# Patient Record
Sex: FEMALE | Race: ASIAN | NOT HISPANIC OR LATINO
[De-identification: names, ages, dates, MRNs, and addresses within clinical notes are randomized per-mention and may not be internally consistent; named-entity substitution may affect disease eponyms.]

---

## 2018-10-25 ENCOUNTER — TRANSCRIPTION ENCOUNTER (OUTPATIENT)
Age: 68
End: 2018-10-25

## 2018-10-27 ENCOUNTER — EMERGENCY (EMERGENCY)
Facility: HOSPITAL | Age: 68
LOS: 1 days | Discharge: HOME | End: 2018-10-27
Attending: EMERGENCY MEDICINE | Admitting: EMERGENCY MEDICINE
Payer: MEDICARE

## 2018-10-27 ENCOUNTER — TRANSCRIPTION ENCOUNTER (OUTPATIENT)
Age: 68
End: 2018-10-27

## 2018-10-27 VITALS
RESPIRATION RATE: 16 BRPM | HEART RATE: 92 BPM | DIASTOLIC BLOOD PRESSURE: 66 MMHG | OXYGEN SATURATION: 98 % | SYSTOLIC BLOOD PRESSURE: 110 MMHG | TEMPERATURE: 98 F

## 2018-10-27 PROCEDURE — 73110 X-RAY EXAM OF WRIST: CPT | Mod: 26,RT

## 2018-10-27 PROCEDURE — 29125 APPL SHORT ARM SPLINT STATIC: CPT | Mod: RT

## 2018-10-27 PROCEDURE — 99284 EMERGENCY DEPT VISIT MOD MDM: CPT | Mod: 25

## 2018-10-27 PROCEDURE — 73630 X-RAY EXAM OF FOOT: CPT | Mod: 26,RT

## 2018-10-27 PROCEDURE — 73090 X-RAY EXAM OF FOREARM: CPT | Mod: 26,RT

## 2018-10-27 PROCEDURE — 73610 X-RAY EXAM OF ANKLE: CPT | Mod: 26,RT

## 2018-10-27 PROCEDURE — 71046 X-RAY EXAM CHEST 2 VIEWS: CPT | Mod: 26

## 2018-10-27 PROCEDURE — 73130 X-RAY EXAM OF HAND: CPT | Mod: 26,RT

## 2018-10-27 PROCEDURE — 29515 APPLICATION SHORT LEG SPLINT: CPT | Mod: RT

## 2018-10-27 PROCEDURE — 73502 X-RAY EXAM HIP UNI 2-3 VIEWS: CPT | Mod: 26,RT

## 2018-10-27 RX ORDER — IBUPROFEN 200 MG
600 TABLET ORAL ONCE
Qty: 0 | Refills: 0 | Status: COMPLETED | OUTPATIENT
Start: 2018-10-27 | End: 2018-10-27

## 2018-10-27 RX ORDER — KETOROLAC TROMETHAMINE 30 MG/ML
30 SYRINGE (ML) INJECTION ONCE
Qty: 0 | Refills: 0 | Status: DISCONTINUED | OUTPATIENT
Start: 2018-10-27 | End: 2018-10-27

## 2018-10-27 RX ORDER — ACETAMINOPHEN 500 MG
650 TABLET ORAL ONCE
Qty: 0 | Refills: 0 | Status: COMPLETED | OUTPATIENT
Start: 2018-10-27 | End: 2018-10-27

## 2018-10-27 RX ORDER — AMOXICILLIN 250 MG/5ML
500 SUSPENSION, RECONSTITUTED, ORAL (ML) ORAL ONCE
Qty: 0 | Refills: 0 | Status: COMPLETED | OUTPATIENT
Start: 2018-10-27 | End: 2018-10-27

## 2018-10-27 RX ADMIN — Medication 650 MILLIGRAM(S): at 13:12

## 2018-10-27 RX ADMIN — Medication 500 MILLIGRAM(S): at 13:12

## 2018-10-27 RX ADMIN — Medication 600 MILLIGRAM(S): at 13:12

## 2018-10-27 NOTE — ED ADULT TRIAGE NOTE - CHIEF COMPLAINT QUOTE
Pt c/o right wrist pain s/p mechanical trip and fall last night, denies blood thinner use/loc/trauma to head/cp/sob/ligtheadedness/dizziness. Pt also states she has been suffering from flu like symptoms which have been slowly resolving. Recent travel to [place] returned 4 days ago.

## 2018-10-27 NOTE — ED PROVIDER NOTE - CARE PLAN
Principal Discharge DX:	Fracture of hand  Secondary Diagnosis:	Ankle sprain Principal Discharge DX:	Fracture of metacarpal shaft of right hand, closed, initial encounter  Assessment and plan of treatment:	You had a thorough evaluation including an exam, labs and imaging. You were given medications for comfort. Please read the attached information sheets as they will provide useful information regarding your condition.    Your discharge diagnosis is: 5th hand metacarpal fracture, tonsilitis, ankle sprain  Please take amoxicillin 500mg x2, 10days  Return precautions to the Emergency Department include but are not limited to: unrelenting nausea, vomiting, fever, difficulty swallowing/breathing, shortness of breath, numbness or tingling, loss of sensation, loss of motor function, or any other concerning symptoms.    1) Follow up with your medical doctor in 2-3 days  2) You should also establish care with Orthopedics/ Hand Surgery/ ENT by calling phone on the ref list [facility] has given to you,  to find a doctor affiliated with [facility] in your neighborhood & network. Please bring your labs and imaging with you to your appointment, as needed.  3) Please take Ibuprofen (aka Motrin, Advil) and/or Acetaminophen (aka Tylenol) over the counter as directed by packaging, as needed, for mild-moderate pain, which can be taken 3-4 hours apart from each other to have a layered effect.  Please do not take these medications if you do no have pain or if you have any history of bleeding disorders, kidney or liver disease. Do not use ibuprofen if you are on blood thinners (anti-coagulation).  4) Drink at least 2 Liters or 64 Ounces of water each day.    - Rest, apply ice over covered skin for no more than 15 minutes at a time, keep affected extremity elevated, use compressive dressing or splint as provided and instructed.  - Please return to the ED should you have any new or worsening symptoms, worsening pain, develop numbness or discoloration or pain in the fingers or any concerning symptoms  - Please follow up with Orthopedic/Hand surgeon  Secondary Diagnosis:	Ankle sprain  Secondary Diagnosis:	Tonsillitis

## 2018-10-27 NOTE — ED PROCEDURE NOTE - NS ED PERI VASCULAR NEG
fingers/toes warm to touch/no swelling/no cyanosis of extremity/capillary refill time < 2 seconds/no paresthesia
fingers/toes warm to touch/no swelling/no cyanosis of extremity/no paresthesia/capillary refill time < 2 seconds

## 2018-10-27 NOTE — ED ADULT TRIAGE NOTE - NS ED NOTE AC [place] CHART2
Clinicians should consider the possibility of Raffaele Influenza A (H7N9) virus infection in people presenting with respiratory illness within 10 days of travel to [place], particularly if the patient reports exposure to birds or poultry markets. Presentation of the flu-like illness can range from severe to mild respiratory illness. When Raffaele Influenza is suspected place a mask on the patient and place within a negative pressure room. Health care personnel wear an N95 respirator, gown, and gloved - adhere to Airborne, Contact, and Standard Precautions.

## 2018-10-27 NOTE — ED PROVIDER NOTE - PHYSICAL EXAMINATION
PGY1/MD Mason.   GEN: No apparent distress, A & O x 4  HEAD/EYES: NC/AT, anicteric sclerae, no conjunctival pallor  ENT: mucus membranes moist, oropharynx + right tonsil enlargement+erythema /wo ubula displacement or discharge, trachea midline, no JVD, neck is supple, no airway compromise  RESP: lungs CTA with equal breath sounds bilaterally, chest wall nontender and atraumatic  CV: heart with reg rhythm S1, S2, no murmur  ABDOMEN: normoactive bowel sounds, soft, nondistended, nontender  Right Hand  Sensation intact to light touch in first dorsalis web space, 5th/3rd finger volar tip.  Motor: flexion & extension against resistance in 1st to 5th fingers, wrist/finger extension off table. + tender on 5th metacarpal.  Skin/Inspection: No erythema, no abrasion, no bruises, +swelling lateral dorsal hand. No snuff box tenderness.  Vascular: CRT<2sec in all digits.  Right Ankle  not able to bear weight in the ED. +lateral malleolar swelling and tender, no navicular/ but + base of 5th metatarsal tenderness. When held in dorsiflexion, pt is not able to ramy its foot. Negative anterior drawer test for lateral ligamentous ankle, negative squeeze test for distal tibiofibular syndesmotic ligament, netative  Sensation: sensation intact to light touch in 5th/1st finger volar tip, inner/outer legs.  Motor: decreased muscle strength due to pain  ROM: limited rom 2/2 pain  Skin: No erythema, no abrasion.  Vascular: CRT<2sec in all digits.

## 2018-10-27 NOTE — ED PROVIDER NOTE - MEDICAL DECISION MAKING DETAILS
PGY1/MD Mason. 69 yo F with no PMH, s/p fall last night, 2/2 weakness, p/w right hand+foot pain. +right 5th metacarpaon oblique fracture, pt has right tonsilitis, needs empirical treatment as streptococcal, plan for cxr, consult for hand, treat tonsilitis, likely d/c and f/u with Ortho+ENT

## 2018-10-27 NOTE — ED PROCEDURE NOTE - NS ED PERI NEURO NEG
Pre-application: Motor, sensory, and vascular responses intact in the injured extremity./Post-application: Motor, sensory, and vascular responses intact in the injured extremity./The patient/caregiver verbalized understanding of how to care for the injured extremity with splint
Pre-application: Motor, sensory, and vascular responses intact in the injured extremity./The patient/caregiver verbalized understanding of how to care for the injured extremity with splint/Post-application: Motor, sensory, and vascular responses intact in the injured extremity.

## 2018-10-27 NOTE — ED PROVIDER NOTE - ATTENDING CONTRIBUTION TO CARE
Dr. Barbosa:  I have personally performed a face to face bedside history and physical examination of this patient. I have discussed the history, examination, review of systems, assessment and plan of management with the resident. I have reviewed the electronic medical record and amended it to reflect my history, review of systems, physical exam, assessment and plan.    68F s/p mechanical fall yesterday, landed on R arm/hand and inverted R foot.  Went to urgent care, diagnosed with R 5th metacarpal fracture.  Also endorses URI sx, although improving.      Exam:  - nad  - rrr  - ctab  - abd soft ntnd  -    A/P  - Dr. Barbosa:  I have personally performed a face to face bedside history and physical examination of this patient. I have discussed the history, examination, review of systems, assessment and plan of management with the resident. I have reviewed the electronic medical record and amended it to reflect my history, review of systems, physical exam, assessment and plan.    68F s/p mechanical fall yesterday, landed on R arm/hand and inverted R foot.  Went to urgent care, diagnosed with R 5th metacarpal fracture.  Also endorses URI sx, although improving, but still T 101 this morning.    Exam:  - nad  - rrr  - ctab  - abd soft ntnd  - +pain with ranging right hip, +swelling and bruising over right ulnar side of hand, neurovascularly intact, no snuffbox tenderness; +swelling and bruising to lateral and medial malleoli of right ankle, +bruising over proximal dorsum of foot, neurovascularly intact in foot    A/P  - mechanical fall; URI eval PNA  - XR right wrist/forearm/hand, XR pelvis/R hip/ankle/foot

## 2018-10-27 NOTE — ED PROVIDER NOTE - PLAN OF CARE
You had a thorough evaluation including an exam, labs and imaging. You were given medications for comfort. Please read the attached information sheets as they will provide useful information regarding your condition.    Your discharge diagnosis is: 5th hand metacarpal fracture, tonsilitis, ankle sprain  Please take amoxicillin 500mg x2, 10days  Return precautions to the Emergency Department include but are not limited to: unrelenting nausea, vomiting, fever, difficulty swallowing/breathing, shortness of breath, numbness or tingling, loss of sensation, loss of motor function, or any other concerning symptoms.    1) Follow up with your medical doctor in 2-3 days  2) You should also establish care with Orthopedics/ Hand Surgery/ ENT by calling phone on the ref list [facility] has given to you,  to find a doctor affiliated with [facility] in your neighborhood & network. Please bring your labs and imaging with you to your appointment, as needed.  3) Please take Ibuprofen (aka Motrin, Advil) and/or Acetaminophen (aka Tylenol) over the counter as directed by packaging, as needed, for mild-moderate pain, which can be taken 3-4 hours apart from each other to have a layered effect.  Please do not take these medications if you do no have pain or if you have any history of bleeding disorders, kidney or liver disease. Do not use ibuprofen if you are on blood thinners (anti-coagulation).  4) Drink at least 2 Liters or 64 Ounces of water each day.    - Rest, apply ice over covered skin for no more than 15 minutes at a time, keep affected extremity elevated, use compressive dressing or splint as provided and instructed.  - Please return to the ED should you have any new or worsening symptoms, worsening pain, develop numbness or discoloration or pain in the fingers or any concerning symptoms  - Please follow up with Orthopedic/Hand surgeon

## 2018-10-27 NOTE — ED PROVIDER NOTE - PROGRESS NOTE DETAILS
Familia: spoke with Dr. Jennifer Kovacs (on hand call today).  Agreeable to ED ulnar-gutter splint for the oblique metacarpal fracture and will see her in office next week. PGY1/MD Mason. volar splint on her right hand, posterior splint on her right lower leg, no vascular or neuro complications, discussed the incidental findings of the chest xray, f/y by PMD has been given. Pt will be receive f/u from Hand surgeon for 5th metacarpal fracture, Ortho for ankle sprain, ENT for tonsilitis, and PMD for general f/u+cxr incidntal findings.

## 2018-10-28 PROBLEM — Z00.00 ENCOUNTER FOR PREVENTIVE HEALTH EXAMINATION: Status: ACTIVE | Noted: 2018-10-28

## 2018-10-28 NOTE — ED POST DISCHARGE NOTE - DETAILS
Patient contact # [phone] and  Msg's left on both #'s with Admin # and hrs and CDU PA #. Patient needs to be told to non wt bear. Patient sent home in posterior splint. Patient needs to have immediate follow up with Ortho or podiatry and told about FX and needs CT. If patient cannot get appt with Orth or podiatry patient needs to return to ED.

## 2018-10-28 NOTE — ED POST DISCHARGE NOTE - RESULT SUMMARY
Peer Marialuisa : Ankle Rt Xray: There is a subtle cortical break involving the calcaneus with assoc flattening of the foot angle representing a calcaneal Fracture. CT Is recommended for complete evaluation

## 2018-10-29 ENCOUNTER — INPATIENT (INPATIENT)
Facility: HOSPITAL | Age: 68
LOS: 4 days | Discharge: NURSING, REHAB OR LONG-TERM CARE | End: 2018-11-03
Attending: INTERNAL MEDICINE | Admitting: INTERNAL MEDICINE
Payer: MEDICARE

## 2018-10-29 VITALS
OXYGEN SATURATION: 96 % | RESPIRATION RATE: 16 BRPM | DIASTOLIC BLOOD PRESSURE: 84 MMHG | HEART RATE: 89 BPM | TEMPERATURE: 98 F | SYSTOLIC BLOOD PRESSURE: 145 MMHG

## 2018-10-29 DIAGNOSIS — S92.009A UNSPECIFIED FRACTURE OF UNSPECIFIED CALCANEUS, INITIAL ENCOUNTER FOR CLOSED FRACTURE: ICD-10-CM

## 2018-10-29 LAB
ALBUMIN SERPL ELPH-MCNC: 3.7 G/DL — SIGNIFICANT CHANGE UP (ref 3.3–5)
ALP SERPL-CCNC: 69 U/L — SIGNIFICANT CHANGE UP (ref 40–120)
ALT FLD-CCNC: 22 U/L — SIGNIFICANT CHANGE UP (ref 4–33)
AST SERPL-CCNC: 31 U/L — SIGNIFICANT CHANGE UP (ref 4–32)
BASOPHILS # BLD AUTO: 0.02 K/UL — SIGNIFICANT CHANGE UP (ref 0–0.2)
BASOPHILS NFR BLD AUTO: 0.3 % — SIGNIFICANT CHANGE UP (ref 0–2)
BILIRUB SERPL-MCNC: 0.4 MG/DL — SIGNIFICANT CHANGE UP (ref 0.2–1.2)
BUN SERPL-MCNC: 13 MG/DL — SIGNIFICANT CHANGE UP (ref 7–23)
CALCIUM SERPL-MCNC: 9.2 MG/DL — SIGNIFICANT CHANGE UP (ref 8.4–10.5)
CHLORIDE SERPL-SCNC: 102 MMOL/L — SIGNIFICANT CHANGE UP (ref 98–107)
CO2 SERPL-SCNC: 24 MMOL/L — SIGNIFICANT CHANGE UP (ref 22–31)
CREAT SERPL-MCNC: 0.55 MG/DL — SIGNIFICANT CHANGE UP (ref 0.5–1.3)
EOSINOPHIL # BLD AUTO: 0.09 K/UL — SIGNIFICANT CHANGE UP (ref 0–0.5)
EOSINOPHIL NFR BLD AUTO: 1.4 % — SIGNIFICANT CHANGE UP (ref 0–6)
GLUCOSE SERPL-MCNC: 109 MG/DL — HIGH (ref 70–99)
HCT VFR BLD CALC: 43 % — SIGNIFICANT CHANGE UP (ref 34.5–45)
HGB BLD-MCNC: 14.1 G/DL — SIGNIFICANT CHANGE UP (ref 11.5–15.5)
IMM GRANULOCYTES # BLD AUTO: 0.02 # — SIGNIFICANT CHANGE UP
IMM GRANULOCYTES NFR BLD AUTO: 0.3 % — SIGNIFICANT CHANGE UP (ref 0–1.5)
LYMPHOCYTES # BLD AUTO: 2.17 K/UL — SIGNIFICANT CHANGE UP (ref 1–3.3)
LYMPHOCYTES # BLD AUTO: 33.5 % — SIGNIFICANT CHANGE UP (ref 13–44)
MCHC RBC-ENTMCNC: 30.7 PG — SIGNIFICANT CHANGE UP (ref 27–34)
MCHC RBC-ENTMCNC: 32.8 % — SIGNIFICANT CHANGE UP (ref 32–36)
MCV RBC AUTO: 93.5 FL — SIGNIFICANT CHANGE UP (ref 80–100)
MONOCYTES # BLD AUTO: 0.55 K/UL — SIGNIFICANT CHANGE UP (ref 0–0.9)
MONOCYTES NFR BLD AUTO: 8.5 % — SIGNIFICANT CHANGE UP (ref 2–14)
NEUTROPHILS # BLD AUTO: 3.62 K/UL — SIGNIFICANT CHANGE UP (ref 1.8–7.4)
NEUTROPHILS NFR BLD AUTO: 56 % — SIGNIFICANT CHANGE UP (ref 43–77)
NRBC # FLD: 0 — SIGNIFICANT CHANGE UP
PLATELET # BLD AUTO: 284 K/UL — SIGNIFICANT CHANGE UP (ref 150–400)
PMV BLD: 10 FL — SIGNIFICANT CHANGE UP (ref 7–13)
POTASSIUM SERPL-MCNC: 4.5 MMOL/L — SIGNIFICANT CHANGE UP (ref 3.5–5.3)
POTASSIUM SERPL-SCNC: 4.5 MMOL/L — SIGNIFICANT CHANGE UP (ref 3.5–5.3)
PROT SERPL-MCNC: 7.4 G/DL — SIGNIFICANT CHANGE UP (ref 6–8.3)
RBC # BLD: 4.6 M/UL — SIGNIFICANT CHANGE UP (ref 3.8–5.2)
RBC # FLD: 13 % — SIGNIFICANT CHANGE UP (ref 10.3–14.5)
SODIUM SERPL-SCNC: 140 MMOL/L — SIGNIFICANT CHANGE UP (ref 135–145)
WBC # BLD: 6.47 K/UL — SIGNIFICANT CHANGE UP (ref 3.8–10.5)
WBC # FLD AUTO: 6.47 K/UL — SIGNIFICANT CHANGE UP (ref 3.8–10.5)

## 2018-10-29 PROCEDURE — 76376 3D RENDER W/INTRP POSTPROCES: CPT | Mod: 26

## 2018-10-29 PROCEDURE — 73700 CT LOWER EXTREMITY W/O DYE: CPT | Mod: 26,RT

## 2018-10-29 PROCEDURE — 99223 1ST HOSP IP/OBS HIGH 75: CPT

## 2018-10-29 PROCEDURE — 73650 X-RAY EXAM OF HEEL: CPT | Mod: 26,RT

## 2018-10-29 RX ORDER — ENOXAPARIN SODIUM 100 MG/ML
40 INJECTION SUBCUTANEOUS DAILY
Qty: 0 | Refills: 0 | Status: DISCONTINUED | OUTPATIENT
Start: 2018-10-29 | End: 2018-11-03

## 2018-10-29 RX ORDER — LACTOBACILLUS ACIDOPHILUS 100MM CELL
1 CAPSULE ORAL ONCE
Qty: 0 | Refills: 0 | Status: COMPLETED | OUTPATIENT
Start: 2018-10-29 | End: 2018-10-29

## 2018-10-29 RX ORDER — BENZOCAINE AND MENTHOL 5; 1 G/100ML; G/100ML
1 LIQUID ORAL ONCE
Qty: 0 | Refills: 0 | Status: COMPLETED | OUTPATIENT
Start: 2018-10-29 | End: 2018-10-29

## 2018-10-29 RX ORDER — AMOXICILLIN 250 MG/5ML
1 SUSPENSION, RECONSTITUTED, ORAL (ML) ORAL
Qty: 20 | Refills: 0 | OUTPATIENT
Start: 2018-10-29 | End: 2018-11-07

## 2018-10-29 RX ORDER — LACTOBACILLUS ACIDOPHILUS 100MM CELL
1 CAPSULE ORAL EVERY 12 HOURS
Qty: 0 | Refills: 0 | Status: DISCONTINUED | OUTPATIENT
Start: 2018-10-29 | End: 2018-11-03

## 2018-10-29 RX ORDER — ENOXAPARIN SODIUM 100 MG/ML
40 INJECTION SUBCUTANEOUS ONCE
Qty: 0 | Refills: 0 | Status: COMPLETED | OUTPATIENT
Start: 2018-10-29 | End: 2018-10-29

## 2018-10-29 RX ORDER — BENZOCAINE AND MENTHOL 5; 1 G/100ML; G/100ML
1 LIQUID ORAL
Qty: 0 | Refills: 0 | Status: DISCONTINUED | OUTPATIENT
Start: 2018-10-30 | End: 2018-11-03

## 2018-10-29 RX ORDER — ACETAMINOPHEN 500 MG
650 TABLET ORAL EVERY 8 HOURS
Qty: 0 | Refills: 0 | Status: DISCONTINUED | OUTPATIENT
Start: 2018-10-29 | End: 2018-10-31

## 2018-10-29 RX ORDER — ASCORBIC ACID 60 MG
500 TABLET,CHEWABLE ORAL DAILY
Qty: 0 | Refills: 0 | Status: DISCONTINUED | OUTPATIENT
Start: 2018-10-29 | End: 2018-11-03

## 2018-10-29 RX ADMIN — BENZOCAINE AND MENTHOL 1 LOZENGE: 5; 1 LIQUID ORAL at 23:56

## 2018-10-29 RX ADMIN — Medication 1 TABLET(S): at 23:56

## 2018-10-29 NOTE — ED PROVIDER NOTE - MEDICAL DECISION MAKING DETAILS
recall for a missed calcaneous fx. seen by podiatry and a CT reveals the fx. admitted to medicine for further management.  of note: cxr indicated a possible rt paratrachael mass. Ct chest requested.

## 2018-10-29 NOTE — ED PROVIDER NOTE - OBJECTIVE STATEMENT
gaston: pt fell 2 days ago, fracturing rt 5th metacarpal and injuring rt foot, and was cared for at [facility]. reread of rt foot film indicated likely calcaneous fx and pt was recalled for CT scan.  pt also notes self medicating with amox for several days for rt tonsillar pain [place]on: pt fell 2 days ago, fracturing rt 5th metacarpal and injuring rt foot, and was cared for at [facility]. reread of rt foot film indicated likely calcaneous fx and pt was recalled for CT scan.  pt also notes self medicating with amox for several days for rt tonsillar pain.

## 2018-10-29 NOTE — H&P ADULT - PROBLEM SELECTOR PLAN 6
DVT Prophylaxis: SQ Lovenox, PT, PTT, INR    UA, Fasting Lipid, TSH, HgbA1c, Iron studies, Vit B12, Folate, Free T4, Ferritin, Hep A,B,C  profile,

## 2018-10-29 NOTE — CONSULT NOTE ADULT - SUBJECTIVE AND OBJECTIVE BOX
Patient is a 68y old  Female who presents with a chief complaint of     HPI:      PAST MEDICAL & SURGICAL HISTORY:  No pertinent past medical history  No significant past surgical history      MEDICATIONS  (STANDING):    MEDICATIONS  (PRN):      Allergies    No Known Allergies    Intolerances        VITALS:    Vital Signs Last 24 Hrs  T(C): 36.7 (29 Oct 2018 12:34), Max: 36.7 (29 Oct 2018 12:34)  T(F): 98 (29 Oct 2018 12:34), Max: 98 (29 Oct 2018 12:34)  HR: 89 (29 Oct 2018 12:34) (89 - 89)  BP: 145/84 (29 Oct 2018 12:34) (145/84 - 145/84)  BP(mean): --  RR: 16 (29 Oct 2018 12:34) (16 - 16)  SpO2: 96% (29 Oct 2018 12:34) (96% - 96%)    LABS:                CAPILLARY BLOOD GLUCOSE              LOWER EXTREMITY PHYSICAL EXAM:    Vasular: DP/PT _/4, B/L, CFT <_ seconds B/L, Temperature gradient _, B/L.   Neuro: Epicritic sensation _ to the level of _, B/L.  Musculoskeletal/Ortho:  Skin:  Wound #1:   Location:  Size:  Depth:  Wound bed:   Drainage:   Odor:   Periwound:  Etiology:     RADIOLOGY & ADDITIONAL STUDIES: Patient is a 68y old  Female who presents with a chief complaint of right heel fx    HPI: 69 yo F w/ no PMHx called back to ER after final XR read of right foot showed possible calcaneal fracture. PT returns to ED for R foot CT scan.      PAST MEDICAL & SURGICAL HISTORY:  No pertinent past medical history  No significant past surgical history      MEDICATIONS  (STANDING):    MEDICATIONS  (PRN):      Allergies    No Known Allergies    Intolerances        VITALS:    Vital Signs Last 24 Hrs  T(C): 36.7 (29 Oct 2018 12:34), Max: 36.7 (29 Oct 2018 12:34)  T(F): 98 (29 Oct 2018 12:34), Max: 98 (29 Oct 2018 12:34)  HR: 89 (29 Oct 2018 12:34) (89 - 89)  BP: 145/84 (29 Oct 2018 12:34) (145/84 - 145/84)  BP(mean): --  RR: 16 (29 Oct 2018 12:34) (16 - 16)  SpO2: 96% (29 Oct 2018 12:34) (96% - 96%)    LABS:                CAPILLARY BLOOD GLUCOSE              LOWER EXTREMITY PHYSICAL EXAM:    Vascular: DP/PT 2/4, B/L, CFT <3 seconds B/L, Temperature gradient within normal limits, B/L. Non pitting edema +2 to right lateral foot   Neuro: Epicritic sensation intact to b/l feet  Musculoskeletal/Ortho: Pain on palpation of lateral foot at the level of the lateral calcaneus, pain w/ motion of STJ, and ankle joint. Muscle strength guarded but in tact  Skin: ecchymosis + erythema to lateral foot distal to the lateral mal and extending to the 5th met base, and dorsal foot. Skin envelope intact, no openings or abrasions in skin    RADIOLOGY & ADDITIONAL STUDIES: Patient is a 68y old  Female who presents with a chief complaint of right heel fx    HPI: 69 yo F w/ no PMHx called back to ER after final XR read of right foot showed possible calcaneal fracture. Pt relates she took a misstep while at home and caught herself on her right wrist. Pt came to ther ER on Friday and was diagnosed w/ a right metacarpal fracture and a right ankle sprain. Pt had a right posterior splint on her leg, and right splint on her wrist placed by the ED on Friday. PT returns to ED for R foot CT scan, after XR was read as possible calc fracture.      PAST MEDICAL & SURGICAL HISTORY:  No pertinent past medical history  No significant past surgical history      MEDICATIONS  (STANDING):    MEDICATIONS  (PRN):      Allergies    No Known Allergies    Intolerances        VITALS:    Vital Signs Last 24 Hrs  T(C): 36.7 (29 Oct 2018 12:34), Max: 36.7 (29 Oct 2018 12:34)  T(F): 98 (29 Oct 2018 12:34), Max: 98 (29 Oct 2018 12:34)  HR: 89 (29 Oct 2018 12:34) (89 - 89)  BP: 145/84 (29 Oct 2018 12:34) (145/84 - 145/84)  BP(mean): --  RR: 16 (29 Oct 2018 12:34) (16 - 16)  SpO2: 96% (29 Oct 2018 12:34) (96% - 96%)        LOWER EXTREMITY PHYSICAL EXAM:    Vascular: DP/PT 2/4, B/L, CFT <3 seconds B/L, Temperature gradient within normal limits, B/L. Non pitting edema +2 to right lateral foot   Neuro: Epicritic sensation intact to b/l feet  Musculoskeletal/Ortho: Pain on palpation of lateral foot at the level of the lateral calcaneus, pain w/ motion of STJ, and ankle joint. Muscle strength guarded but in tact  Skin: ecchymosis + erythema to lateral foot distal to the lateral mal and extending to the 5th met base, and dorsal foot. Skin envelope intact, no openings or abrasions in skin, no blistering

## 2018-10-29 NOTE — H&P ADULT - NSHPLABSRESULTS_GEN_ALL_CORE
< from: Xray Chest 2 Views PA/Lat (10.27.18 @ 14:06) >    EXAM:  XR CHEST PA LAT 2V        PROCEDURE DATE:  Oct 27 2018         INTERPRETATION:  CLINICAL INFORMATION: Sore throat, muscle pain, mild   fever with cough.    EXAM: Frontal radiographs of the chest.    COMPARISON: None        FINDINGS:    Right upper paratracheal opacity is of unclear etiology. Cross-sectional   imaging is recommended for further evaluation. There are no pleural   effusions or pneumothorax.    The cardiomediastinal silhouette is poorly evaluated.    The visualized osseous and soft tissue structures demonstrate no acute   pathology.

## 2018-10-29 NOTE — H&P ADULT - HISTORY OF PRESENT ILLNESS
69 y/o female HX of Osteoporosis on Evista, No past surgery, Father  from [place], Last Colonoscopy  unremarkable as per pt, last Mammogram 2018 unremarkable as per pt, last PAP smear 2017, unremarkable as per pt, Recent travel to [place] returned one week ago, she stayed in [place] for 2 weeks, no sick contact in [place] as per pt,   patient  fell 2 days ago at home due to weakness and recent travel, mechanical fall, no LOC, no head trauma,  fracturing Rt  5th metacarpal and injuring and Rt  foot, and she was cared for at [facility],  reread of Rt foot film indicated  calcaneous fx and pt was recalled for CT scan, pt also c/o  several days for  tonsillar pain, + Fever at home, sore   throat, + dry Cough, No wt Loss, no Hemoptysis, no Diaphoresis, No rash., no diarrhea, no HA, no Dizziness, no dysuria, no abdominal pain, No CP, NO SOB, pt was started on PO Amoxicillin 500 mg BID on the last ER visit, Chest X ray showed :< from: Xray Chest 2 Views PA/Lat (10.27.18 @ 14:06) >Right upper paratracheal opacity is of unclear etiology. CT Chest No Contrast was ordered tonight as well as RVP,   Pt had a right posterior splint on her leg, and right splint on her wrist placed by the ED on Friday. Pt was seen By Podiatry tonight , needs pain control, PT consult, NWB , indication for surgery will be decided    later on as per Podiatry note , Pt needs Hand surgery consult as well  in AM, pt awake, A+O x 3, 67 y/o female HX of Osteoporosis on Evista, No past surgery, Father  from [place], Last Colonoscopy  unremarkable as per pt, last Mammogram 2018 unremarkable as per pt, last PAP smear 2017, unremarkable as per pt, Recent travel to [place] returned one week ago, she stayed in [place] for 2 weeks, no sick contact in [place] as per pt,   patient  fell 2 days ago at home due to weakness and recent travel, mechanical fall, no LOC, no head trauma,  fracturing Rt  5th metacarpal and injuring and Rt  foot, and she was cared for at [facility],  reread of Rt foot film indicated  calcaneous fx and pt was recalled for CT scan, pt also c/o  several days for  tonsillar pain, + Fever at home, sore   throat, + dry Cough, No wt Loss, no Hemoptysis, no Diaphoresis, No rash., no diarrhea, no HA, no Dizziness, no dysuria, no abdominal pain, No CP, NO SOB, pt was started on PO Amoxicillin 500 mg BID on the last ER visit, Chest X ray showed :< from: Xray Chest 2 Views PA/Lat (10.27.18 @ 14:06) >Right upper paratracheal opacity is of unclear etiology. CT Chest No Contrast was ordered tonight as well as RVP,   Pt had a right posterior splint on her leg, and right splint on her wrist placed by the ED on Friday. Pt was seen By Podiatry tonight , needs pain control, PT consult, NWB , indication for surgery will be decided    later on as per Podiatry note , Pt needs Hand surgery consult as well  in AM, pt awake, A+O x 3,     Labs: Na 140, K+ 4.5, BUN 13, Creatinine 0.55, Glucose 109, LFT Normal, WBC 6.47, Hgb 14.1, Platelet 284 ,     Vitals: Tem 98.1, HR 84, /74, RR 18, 95% RA,

## 2018-10-29 NOTE — CONSULT NOTE ADULT - ASSESSMENT
67 yo F w/ R foot calcaneal fx  - Pt seen and examined  - 67 yo F w/ R foot calcaneal fx  - Pt seen and examined  - +edema and ecchymosis at right lateral foot  - CT +for calcaneal fracture, minimal lateral wall blow out w/ min stepoff deformity at STJ   - Awaiting calcaneal axial view to determine extent of varus deformity   - Final surgical vs. conservative plan pending calc axial view  - Rec admit because pt is unable to be NWB at home w/ the metacarpal fx preventing her from properly using a walker or crutches  - Social admit at this time (not admitted for OR planning)  - Right posterior splint applied  - Will f/u XRs  - Discussed w/ attending

## 2018-10-29 NOTE — ED ADULT NURSE NOTE - NSIMPLEMENTINTERV_GEN_ALL_ED
Implemented All Fall Risk Interventions:  [place] to call system. Call bell, personal items and telephone within reach. Instruct patient to call for assistance. Room bathroom lighting operational. Non-slip footwear when patient is off stretcher. Physically safe environment: no spills, clutter or unnecessary equipment. Stretcher in lowest position, wheels locked, appropriate side rails in place. Provide visual cue, wrist band, yellow gown, etc. Monitor gait and stability. Monitor for mental status changes and reorient to person, place, and time. Review medications for side effects contributing to fall risk. Reinforce activity limits and safety measures with patient and family.

## 2018-10-29 NOTE — H&P ADULT - ATTENDING COMMENTS
Pt was seen & examined by me , Dr. DENI Gonzalez on 10/29/18.     Dr. Worrell will resume the care of pt in AM.

## 2018-10-29 NOTE — H&P ADULT - PROBLEM SELECTOR PLAN 3
recent Travel to [place], RVP, PO Augmentin, Bacid, HIV Test, Mono Spot, EBV, pt has been on PO Amoxicillin as outpatient for the past 2 days, Consider ENT Consult if NO Improvement, Cepacol for pain, Hep A,B,C  profile, F/U CBC, CMP, No wt Loss, fever at home?, + Dry Cough x one week, no sick contact? No Hemoptysis, no diaphoresis, No night Sweets, No Concern for TB at this ponit , waiting for CT Chest no contrast, No fever  in [facility], R/O strep Throat, no Yield for Throat Culture since pt has been on PO Amoxicillin as outpatient x 2 days,

## 2018-10-29 NOTE — H&P ADULT - ASSESSMENT
69 y/o female HX of Osteoporosis on Evista, No past surgery, Father  from [place], Last Colonoscopy  unremarkable as per pt, last Mammogram 2018 unremarkable as per pt, last PAP smear 2017, unremarkable as per pt, Recent travel to [place] returned one week ago, she stayed in [place] for 2 weeks, no sick contact in [place] as per pt,   patient  fell 2 days ago at home due to weakness and recent travel, mechanical fall, no LOC, no head trauma,  fracturing Rt  5th metacarpal and injuring and Rt  foot, and she was cared for at [facility],  reread of Rt foot film indicated  calcaneous fx and pt was recalled for CT scan, pt also c/o  several days for  tonsillar pain, + Fever at home, sore   throat, + dry Cough, No wt Loss, no Hemoptysis, no Diaphoresis, No rash., no diarrhea, no HA, no Dizziness, no dysuria, no abdominal pain, No CP, NO SOB, pt was started on PO Amoxicillin 500 mg BID on the last ER visit, Chest X ray showed :< from: Xray Chest 2 Views PA/Lat (10.27.18 @ 14:06) >Right upper paratracheal opacity is of unclear etiology. CT Chest No Contrast was ordered tonight as well as RVP,   Pt had a right posterior splint on her leg, and right splint on her wrist placed by the ED on Friday. Pt was seen By Podiatry tonight , needs pain control, PT consult, NWB , indication for surgery will be decided    later on as per Podiatry note , Pt needs Hand surgery consult as well  in AM, pt awake, A+O x 3,

## 2018-10-30 ENCOUNTER — TRANSCRIPTION ENCOUNTER (OUTPATIENT)
Age: 68
End: 2018-10-30

## 2018-10-30 DIAGNOSIS — Z29.9 ENCOUNTER FOR PROPHYLACTIC MEASURES, UNSPECIFIED: ICD-10-CM

## 2018-10-30 DIAGNOSIS — M81.0 AGE-RELATED OSTEOPOROSIS WITHOUT CURRENT PATHOLOGICAL FRACTURE: ICD-10-CM

## 2018-10-30 DIAGNOSIS — R93.89 ABNORMAL FINDINGS ON DIAGNOSTIC IMAGING OF OTHER SPECIFIED BODY STRUCTURES: ICD-10-CM

## 2018-10-30 DIAGNOSIS — S92.009A UNSPECIFIED FRACTURE OF UNSPECIFIED CALCANEUS, INITIAL ENCOUNTER FOR CLOSED FRACTURE: ICD-10-CM

## 2018-10-30 DIAGNOSIS — S62.309A UNSPECIFIED FRACTURE OF UNSPECIFIED METACARPAL BONE, INITIAL ENCOUNTER FOR CLOSED FRACTURE: ICD-10-CM

## 2018-10-30 DIAGNOSIS — J02.9 ACUTE PHARYNGITIS, UNSPECIFIED: ICD-10-CM

## 2018-10-30 LAB
ALBUMIN SERPL ELPH-MCNC: 4 G/DL — SIGNIFICANT CHANGE UP (ref 3.3–5)
ALP SERPL-CCNC: 78 U/L — SIGNIFICANT CHANGE UP (ref 40–120)
ALT FLD-CCNC: 27 U/L — SIGNIFICANT CHANGE UP (ref 4–33)
APPEARANCE UR: CLEAR — SIGNIFICANT CHANGE UP
APTT BLD: 39.6 SEC — HIGH (ref 27.5–37.4)
AST SERPL-CCNC: 29 U/L — SIGNIFICANT CHANGE UP (ref 4–32)
B PERT DNA SPEC QL NAA+PROBE: SIGNIFICANT CHANGE UP
BASOPHILS # BLD AUTO: 0.02 K/UL — SIGNIFICANT CHANGE UP (ref 0–0.2)
BASOPHILS NFR BLD AUTO: 0.3 % — SIGNIFICANT CHANGE UP (ref 0–2)
BILIRUB SERPL-MCNC: 0.6 MG/DL — SIGNIFICANT CHANGE UP (ref 0.2–1.2)
BILIRUB UR-MCNC: NEGATIVE — SIGNIFICANT CHANGE UP
BLOOD UR QL VISUAL: NEGATIVE — SIGNIFICANT CHANGE UP
BUN SERPL-MCNC: 12 MG/DL — SIGNIFICANT CHANGE UP (ref 7–23)
C PNEUM DNA SPEC QL NAA+PROBE: NOT DETECTED — SIGNIFICANT CHANGE UP
CALCIUM SERPL-MCNC: 9.6 MG/DL — SIGNIFICANT CHANGE UP (ref 8.4–10.5)
CHLORIDE SERPL-SCNC: 101 MMOL/L — SIGNIFICANT CHANGE UP (ref 98–107)
CHOLEST SERPL-MCNC: 189 MG/DL — SIGNIFICANT CHANGE UP (ref 120–199)
CO2 SERPL-SCNC: 26 MMOL/L — SIGNIFICANT CHANGE UP (ref 22–31)
COLOR SPEC: YELLOW — SIGNIFICANT CHANGE UP
CREAT SERPL-MCNC: 0.65 MG/DL — SIGNIFICANT CHANGE UP (ref 0.5–1.3)
EBV EA AB TITR SER IF: POSITIVE — SIGNIFICANT CHANGE UP
EBV EA IGG SER-ACNC: POSITIVE — SIGNIFICANT CHANGE UP
EBV PATRN SPEC IB-IMP: SIGNIFICANT CHANGE UP
EBV VCA IGG AVIDITY SER QL IA: POSITIVE — SIGNIFICANT CHANGE UP
EBV VCA IGM TITR FLD: NEGATIVE — SIGNIFICANT CHANGE UP
EOSINOPHIL # BLD AUTO: 0.07 K/UL — SIGNIFICANT CHANGE UP (ref 0–0.5)
EOSINOPHIL NFR BLD AUTO: 1.1 % — SIGNIFICANT CHANGE UP (ref 0–6)
FERRITIN SERPL-MCNC: 991 NG/ML — HIGH (ref 15–150)
FLUAV H1 2009 PAND RNA SPEC QL NAA+PROBE: NOT DETECTED — SIGNIFICANT CHANGE UP
FLUAV H1 RNA SPEC QL NAA+PROBE: NOT DETECTED — SIGNIFICANT CHANGE UP
FLUAV H3 RNA SPEC QL NAA+PROBE: NOT DETECTED — SIGNIFICANT CHANGE UP
FLUAV SUBTYP SPEC NAA+PROBE: SIGNIFICANT CHANGE UP
FLUBV RNA SPEC QL NAA+PROBE: NOT DETECTED — SIGNIFICANT CHANGE UP
FOLATE SERPL-MCNC: > 20 NG/ML — HIGH (ref 4.7–20)
GLUCOSE SERPL-MCNC: 90 MG/DL — SIGNIFICANT CHANGE UP (ref 70–99)
GLUCOSE UR-MCNC: NEGATIVE — SIGNIFICANT CHANGE UP
HADV DNA SPEC QL NAA+PROBE: NOT DETECTED — SIGNIFICANT CHANGE UP
HBA1C BLD-MCNC: 5.8 % — HIGH (ref 4–5.6)
HCOV 229E RNA SPEC QL NAA+PROBE: NOT DETECTED — SIGNIFICANT CHANGE UP
HCOV HKU1 RNA SPEC QL NAA+PROBE: NOT DETECTED — SIGNIFICANT CHANGE UP
HCOV NL63 RNA SPEC QL NAA+PROBE: NOT DETECTED — SIGNIFICANT CHANGE UP
HCOV OC43 RNA SPEC QL NAA+PROBE: NOT DETECTED — SIGNIFICANT CHANGE UP
HCT VFR BLD CALC: 45.5 % — HIGH (ref 34.5–45)
HDLC SERPL-MCNC: 43 MG/DL — LOW (ref 45–65)
HETEROPH AB TITR SER AGGL: NEGATIVE — SIGNIFICANT CHANGE UP
HGB BLD-MCNC: 14.7 G/DL — SIGNIFICANT CHANGE UP (ref 11.5–15.5)
HIV 1+2 AB+HIV1 P24 AG SERPL QL IA: SIGNIFICANT CHANGE UP
HMPV RNA SPEC QL NAA+PROBE: NOT DETECTED — SIGNIFICANT CHANGE UP
HPIV1 RNA SPEC QL NAA+PROBE: NOT DETECTED — SIGNIFICANT CHANGE UP
HPIV2 RNA SPEC QL NAA+PROBE: NOT DETECTED — SIGNIFICANT CHANGE UP
HPIV3 RNA SPEC QL NAA+PROBE: NOT DETECTED — SIGNIFICANT CHANGE UP
HPIV4 RNA SPEC QL NAA+PROBE: NOT DETECTED — SIGNIFICANT CHANGE UP
IMM GRANULOCYTES # BLD AUTO: 0.02 # — SIGNIFICANT CHANGE UP
IMM GRANULOCYTES NFR BLD AUTO: 0.3 % — SIGNIFICANT CHANGE UP (ref 0–1.5)
INR BLD: 0.91 — SIGNIFICANT CHANGE UP (ref 0.88–1.17)
IRON SATN MFR SERPL: 286 UG/DL — SIGNIFICANT CHANGE UP (ref 140–530)
IRON SATN MFR SERPL: 63 UG/DL — SIGNIFICANT CHANGE UP (ref 30–160)
KETONES UR-MCNC: NEGATIVE — SIGNIFICANT CHANGE UP
LEUKOCYTE ESTERASE UR-ACNC: NEGATIVE — SIGNIFICANT CHANGE UP
LIPID PNL WITH DIRECT LDL SERPL: 132 MG/DL — SIGNIFICANT CHANGE UP
LYMPHOCYTES # BLD AUTO: 2.29 K/UL — SIGNIFICANT CHANGE UP (ref 1–3.3)
LYMPHOCYTES # BLD AUTO: 37.2 % — SIGNIFICANT CHANGE UP (ref 13–44)
M PNEUMO DNA SPEC QL NAA+PROBE: NOT DETECTED — SIGNIFICANT CHANGE UP
MAGNESIUM SERPL-MCNC: 2.6 MG/DL — SIGNIFICANT CHANGE UP (ref 1.6–2.6)
MCHC RBC-ENTMCNC: 30.6 PG — SIGNIFICANT CHANGE UP (ref 27–34)
MCHC RBC-ENTMCNC: 32.3 % — SIGNIFICANT CHANGE UP (ref 32–36)
MCV RBC AUTO: 94.8 FL — SIGNIFICANT CHANGE UP (ref 80–100)
MONOCYTES # BLD AUTO: 0.54 K/UL — SIGNIFICANT CHANGE UP (ref 0–0.9)
MONOCYTES NFR BLD AUTO: 8.8 % — SIGNIFICANT CHANGE UP (ref 2–14)
NEUTROPHILS # BLD AUTO: 3.22 K/UL — SIGNIFICANT CHANGE UP (ref 1.8–7.4)
NEUTROPHILS NFR BLD AUTO: 52.3 % — SIGNIFICANT CHANGE UP (ref 43–77)
NITRITE UR-MCNC: NEGATIVE — SIGNIFICANT CHANGE UP
NRBC # FLD: 0 — SIGNIFICANT CHANGE UP
PH UR: 6 — SIGNIFICANT CHANGE UP (ref 5–8)
PHOSPHATE SERPL-MCNC: 3.6 MG/DL — SIGNIFICANT CHANGE UP (ref 2.5–4.5)
PLATELET # BLD AUTO: 337 K/UL — SIGNIFICANT CHANGE UP (ref 150–400)
PMV BLD: 10.2 FL — SIGNIFICANT CHANGE UP (ref 7–13)
POTASSIUM SERPL-MCNC: 4.6 MMOL/L — SIGNIFICANT CHANGE UP (ref 3.5–5.3)
POTASSIUM SERPL-SCNC: 4.6 MMOL/L — SIGNIFICANT CHANGE UP (ref 3.5–5.3)
PROT SERPL-MCNC: 8.1 G/DL — SIGNIFICANT CHANGE UP (ref 6–8.3)
PROT UR-MCNC: 10 — SIGNIFICANT CHANGE UP
PROTHROM AB SERPL-ACNC: 10.5 SEC — SIGNIFICANT CHANGE UP (ref 9.8–13.1)
RBC # BLD: 4.8 M/UL — SIGNIFICANT CHANGE UP (ref 3.8–5.2)
RBC # FLD: 12.9 % — SIGNIFICANT CHANGE UP (ref 10.3–14.5)
RSV RNA SPEC QL NAA+PROBE: NOT DETECTED — SIGNIFICANT CHANGE UP
RV+EV RNA SPEC QL NAA+PROBE: NOT DETECTED — SIGNIFICANT CHANGE UP
SODIUM SERPL-SCNC: 143 MMOL/L — SIGNIFICANT CHANGE UP (ref 135–145)
SP GR SPEC: 1.02 — SIGNIFICANT CHANGE UP (ref 1–1.04)
T4 FREE SERPL-MCNC: 1.82 NG/DL — HIGH (ref 0.9–1.8)
TRIGL SERPL-MCNC: 174 MG/DL — HIGH (ref 10–149)
TSH SERPL-MCNC: 2.56 UIU/ML — SIGNIFICANT CHANGE UP (ref 0.27–4.2)
UIBC SERPL-MCNC: 222.6 UG/DL — SIGNIFICANT CHANGE UP (ref 110–370)
UROBILINOGEN FLD QL: NORMAL — SIGNIFICANT CHANGE UP
VIT B12 SERPL-MCNC: 672 PG/ML — SIGNIFICANT CHANGE UP (ref 200–900)
WBC # BLD: 6.16 K/UL — SIGNIFICANT CHANGE UP (ref 3.8–10.5)
WBC # FLD AUTO: 6.16 K/UL — SIGNIFICANT CHANGE UP (ref 3.8–10.5)

## 2018-10-30 PROCEDURE — 71250 CT THORAX DX C-: CPT | Mod: 26

## 2018-10-30 RX ORDER — SODIUM CHLORIDE 9 MG/ML
1000 INJECTION INTRAMUSCULAR; INTRAVENOUS; SUBCUTANEOUS
Qty: 0 | Refills: 0 | Status: DISCONTINUED | OUTPATIENT
Start: 2018-10-30 | End: 2018-11-03

## 2018-10-30 RX ADMIN — Medication 1 TABLET(S): at 18:09

## 2018-10-30 RX ADMIN — Medication 1 TABLET(S): at 06:50

## 2018-10-30 RX ADMIN — ENOXAPARIN SODIUM 40 MILLIGRAM(S): 100 INJECTION SUBCUTANEOUS at 13:11

## 2018-10-30 RX ADMIN — Medication 1 TABLET(S): at 18:08

## 2018-10-30 RX ADMIN — BENZOCAINE AND MENTHOL 1 LOZENGE: 5; 1 LIQUID ORAL at 18:09

## 2018-10-30 RX ADMIN — BENZOCAINE AND MENTHOL 1 LOZENGE: 5; 1 LIQUID ORAL at 13:11

## 2018-10-30 RX ADMIN — BENZOCAINE AND MENTHOL 1 LOZENGE: 5; 1 LIQUID ORAL at 06:53

## 2018-10-30 RX ADMIN — ENOXAPARIN SODIUM 40 MILLIGRAM(S): 100 INJECTION SUBCUTANEOUS at 01:12

## 2018-10-30 RX ADMIN — Medication 500 MILLIGRAM(S): at 13:12

## 2018-10-30 NOTE — CONSULT NOTE ADULT - SUBJECTIVE AND OBJECTIVE BOX
Dilip Enciso MD  Interventional Cardiology   [place] Office : [address] [place] [address] [place] [address]  Tel:   [place] Office : [address] [facility] [place] [address]  Tel: [phone]  Cell : [phone]    HISTORY OF PRESENT ILLNESS:    69 y/o female HX of Osteoporosis on Evista, No past surgery, Father  from Colon CA, Last Colonoscopy  unremarkable as per pt, last Mammogram 2018 unremarkable as per pt, last PAP smear 2017, unremarkable as per pt, Recent travel to [place] returned one week ago, she stayed in [place] for 2 weeks, no sick contact in [place] as per pt,   patient  fell 2 days ago at home due to weakness and recent travel, mechanical fall, no LOC, no head trauma,  fracturing Rt  5th metacarpal and injuring and Rt  foot, and she was cared for at [facility],  reread of Rt foot film indicated  calcaneous fx and pt was recalled for CT scan, pt also c/o  several days for  tonsillar pain, + Fever at home, sore   throat, + dry Cough, No wt Loss, no Hemoptysis, no Diaphoresis, No rash., no diarrhea, no HA, no Dizziness, no dysuria, no abdominal pain, No CP, NO SOB, pt was started on PO Amoxicillin 500 mg BID on the last ER visit, Chest X ray showed :< from: Xray Chest 2 Views PA/Lat (10.27.18 @ 14:06) >Right upper paratracheal opacity is of unclear etiology. CT Chest No Contrast was ordered tonight as well as RVP,   Pt had a right posterior splint on her leg, and right splint on her wrist placed by the ED on Friday. Pt was seen By Podiatry tonight , needs pain control, PT consult, NWB , indication for surgery will be decided    later on as per Podiatry note , Pt needs Hand surgery consult as well  in AM, pt awake, A+O x 3,     Labs: Na 140, K+ 4.5, BUN 13, Creatinine 0.55, Glucose 109, LFT Normal, WBC 6.47, Hgb 14.1, Platelet 284 ,     Denies CP in exertion , Mets >4 , denies SOB on exertion, palpitation, Syncope, Orthopnea or PND     PAST MEDICAL & SURGICAL HISTORY:  Osteoporosis  No significant past surgical history    	    MEDICATIONS:  enoxaparin Injectable 40 milliGRAM(s) SubCutaneous daily    amoxicillin  875 milliGRAM(s)/clavulanate 1 Tablet(s) Oral two times a day      acetaminophen   Tablet .. 650 milliGRAM(s) Oral every 8 hours PRN        ascorbic acid 500 milliGRAM(s) Oral daily  benzocaine 15 mG/menthol 3.6 mG Lozenge 1 Lozenge Oral four times a day  calcium carbonate 1250 mG  + Vitamin D (OsCal 500 + D) 1 Tablet(s) Oral two times a day  sodium chloride 0.9%. 1000 milliLiter(s) IV Continuous <Continuous>      FAMILY HISTORY:  Family history of colon cancer in father (Father)        Allergies    No Known Allergies    Intolerances    	      PHYSICAL EXAM:  T(C): 36.9 (10-30-18 @ 17:14), Max: 37.2 (10-30-18 @ 10:45)  HR: 88 (10-30-18 @ 17:14) (66 - 92)  BP: 127/87 (10-30-18 @ 17:14) (122/85 - 153/99)  RR: 16 (10-30-18 @ 17:14) (16 - 18)  SpO2: 96% (10-30-18 @ 17:14) (95% - 98%)  Wt(kg): --  I&O's Summary      Appearance: Normal	  HEENT:   Normal oral mucosa, PERRL, EOMI	  Cardiovascular: Normal S1 S2, No JVD, No murmurs, No edema  Respiratory: Lungs clear to auscultation	  Gastrointestinal:  Soft, Non-tender, + BS	  Extremities: no edema    LABS:	 	    CARDIAC MARKERS:                                  14.7   6.16  )-----------( 337      ( 30 Oct 2018 06:50 )             45.5     10-    143  |  101  |  12  ----------------------------<  90  4.6   |  26  |  0.65    Ca    9.6      30 Oct 2018 06:50  Phos  3.6     10-  Mg     2.6     10-30    TPro  8.1  /  Alb  4.0  /  TBili  0.6  /  DBili  x   /  AST  29  /  ALT  27  /  AlkPhos  78  10-30    proBNP:   Lipid Profile:   HgA1c: Hemoglobin A1C, Whole Blood: 5.8 % (10-30 @ 06:50)    TSH: Thyroid Stimulating Hormone, Serum: 2.56 uIU/mL (10-30 @ 06:50)

## 2018-10-30 NOTE — PHYSICAL THERAPY INITIAL EVALUATION ADULT - PERTINENT HX OF CURRENT PROBLEM, REHAB EVAL
Pt. is a 68 year old female admitted to [facility] secondary to closed fracture of calcaneous. PMH: osteoporosis

## 2018-10-30 NOTE — PROGRESS NOTE ADULT - ASSESSMENT
Problem/Plan - 1:  ·  Problem: Calcaneus fracture.  Plan: Podiatry F/U, Bed rest, PT consult, NWB Rt Leg/Foot, Pain control, Fall/aspiration precaution,     Problem/Plan - 2:  ·  Problem: Metacarpal bone fracture.  Plan: Hand Surgery consult in AM, NWB, PT consult, Fall/aspiration precaution, pain control,     Problem/Plan - 3:  ·  Problem: Sore throat.  Plan: recent Travel to [place], RVP, PO Augmentin, Bacid, HIV Test, Mono Spot, EBV, pt has been on PO Amoxicillin as outpatient for the past 2 days, Consider ENT Consult if NO Improvement, Cepacol for pain, Hep A,B,C  profile, F/U CBC, CMP, No wt Loss, fever at home?, + Dry Cough x one week, no sick contact? No Hemoptysis, no diaphoresis, No night Sweets, No Concern for TB at this ponit , waiting for CT Chest no contrast, No fever  in [facility], R/O strep Throat, no Yield for Throat Culture since pt has been on PO Amoxicillin as outpatient x 2 days,     Problem/Plan - 4:  ·  Problem: Abnormal chest x-ray.  Plan: CT Chest No contrast,  As per Chest X Ray:  Right upper paratracheal opacity?     Problem/Plan - 5:  ·  Problem: Osteoporosis.  Plan: Hold Evista for now,  pt is High risk for DVT, + fractures ,   On Ca + Vit D,     Pt medically cleared for the procedure planned

## 2018-10-30 NOTE — PHYSICAL THERAPY INITIAL EVALUATION ADULT - PATIENT PROFILE REVIEW, REHAB EVAL
yes/Pt. profile reviewed, consulted with RN Elisabeth TRUJILLO prior to initial PT evaluation and tx, as per RN, Pt. is OK to participate in skilled therapy session, current activity orders bed rest

## 2018-10-30 NOTE — PROGRESS NOTE ADULT - ASSESSMENT
67 yo F w/ R foot calcaneal fx  - Pt seen and examined  - edema greatly improved in foot  - CT + for calcaneal fracture, minimal lateral wall blow out w/ min stepoff deformity at STJ    - Discussed pros and cons of surgery. Pt would like time to think about surgery prior to any final decision.  - Final plan pending patient decision  - PT consult pending  - Right posterior splint reapplied  - Seen w/ attending 67 yo F w/ R foot calcaneal fx  - Pt seen and examined  - edema greatly improved in foot  - CT + for calcaneal fracture, minimal lateral wall blow out w/ min stepoff deformity at STJ    - Discussed pros and cons of surgery. Pt would like time to think about surgery prior to any final decision.  - Final plan pending patient decision  - PT consult pending  - Right posterior splint reapplied  - Please document medical optimization, likely planning OR tomorrow 10/31  - Seen w/ attending

## 2018-10-30 NOTE — CONSULT NOTE ADULT - ASSESSMENT
67 y/o female HX of Osteoporosis on Evista, No past surgery, Father  from Colon CA, Last Colonoscopy  unremarkable as per pt, last Mammogram 2018 unremarkable as per pt, last PAP smear 2017, unremarkable as per pt, Recent travel to [place] returned one week ago, she stayed in [place] for 2 weeks, no sick contact in [place] as per pt,   patient  fell 2 days ago at home due to weakness and recent travel, mechanical fall, no LOC, no head trauma,  fracturing Rt  5th metacarpal and injuring and Rt  foot, and she was cared for at [facility],  reread of Rt foot film indicated  calcaneous fx and pt was recalled for CT scan, pt also c/o  several days for  tonsillar pain, + Fever at home, sore   throat, + dry Cough, No wt Loss, no Hemoptysis, no Diaphoresis, No rash., no diarrhea, no HA, no Dizziness, no dysuria, no abdominal pain, No CP, NO SOB, pt was started on PO Amoxicillin 500 mg BID on the last ER visit, Chest X ray showed :< from: Xray Chest 2 Views PA/Lat (10.27.18 @ 14:06) >Right upper paratracheal opacity is of unclear etiology. CT Chest No Contrast was ordered tonight as well as RVP,   Pt had a right posterior splint on her leg, and right splint on her wrist placed by the ED on Friday. Pt was seen By Podiatry tonight , needs pain control, PT consult, NWB , indication for surgery will be decided    later on as per Podiatry note , Pt needs Hand surgery consult as well  in AM, pt awake, A+O x 3,

## 2018-10-30 NOTE — PHYSICAL THERAPY INITIAL EVALUATION ADULT - MANUAL MUSCLE TESTING RESULTS, REHAB EVAL
Bilateral UE muscle strength grossly 3/5 Throughout however right wrist/hand not formally assessed; Bilateral LE muscle strength grossly 3/5 Throughout however, Right ankle strength not formally assessed

## 2018-10-30 NOTE — PHYSICAL THERAPY INITIAL EVALUATION ADULT - RANGE OF MOTION EXAMINATION, REHAB EVAL
bilateral upper extremity ROM was WFL (within functional limits)/bilateral lower extremity ROM was WFL (within functional limits)/right wrist/hand and right ankle ROM not formally assessed secondary to (+) cast.

## 2018-10-30 NOTE — CONSULT NOTE ADULT - ASSESSMENT
EKG not in chart , ordered     Assessment and Plan     1) Perioperative risk assessment: Will order EKG, RCRI Class I , 0.4% risk of perioperative MACE with Rt. Ankle surgery, will f/u EKG    2) DVT PPX lovenox     3) Pain control primary team

## 2018-10-30 NOTE — PHYSICAL THERAPY INITIAL EVALUATION ADULT - ADDITIONAL COMMENTS
Pt. lives in a pvt house with her spouse. Pt. reports that she has steps with handrails x1 to negotiate at home. Pt. previously ambulating independently without an assistive device and independent with ADLs. Pt. returned to bed all lines/tubes intact, call bell in reach and in NAD.

## 2018-10-30 NOTE — CHART NOTE - NSCHARTNOTEFT_GEN_A_CORE
Patient is a 68y old  Female who presents with a chief complaint of S/P Mechanical fall 2 days ago, + Rt 5th Metacarpal FX, Rt Calcaneal FX , + sore throat, (30 Oct 2018 07:45)      xray right hand- Displaced obliquely oriented fracture of the fifth metacarpal with associated overlying tissue swelling.    Hand surgery consult called [phone]. will f/u recs

## 2018-10-30 NOTE — CONSULT NOTE ADULT - NSPROBSELRECBLANK_6_GEN
DISPLAY PLAN FREE TEXT
Alert and oriented to person, place and time, memory intact, behavior appropriate to situation, PERRL.

## 2018-10-30 NOTE — CONSULT NOTE ADULT - SUBJECTIVE AND OBJECTIVE BOX
Patient is a 68y old  Female who presents with a chief complaint of S/P Mechanical fall 2 days ago, + Rt 5th Metacarpal FX, Rt Calcaneal FX , + sore throat, (30 Oct 2018 07:45)      HPI:  67 y/o female HX of Osteoporosis on Evista, No past surgery, Father  from Colon [place], Last Colonoscopy  unremarkable as per pt, last Mammogram  unremarkable as per pt, last PAP smear , unremarkable as per pt, Recent travel to [place] returned one week ago, she stayed in [place] for 2 weeks, no sick contact in [place] as per pt,   patient  fell 2 days ago at home due to weakness and recent travel, mechanical fall, no LOC, no head trauma,  fracturing Rt  5th metacarpal and injuring and Rt  foot, and she was cared for at [facility],  reread of Rt foot film indicated  calcaneous fx and pt was recalled for CT scan, pt also c/o  several days for  tonsillar pain, + Fever at home, sore   throat, + dry Cough, No wt Loss, no Hemoptysis, no Diaphoresis, No rash., no diarrhea, no HA, no Dizziness, no dysuria, no abdominal pain, No CP, NO SOB, pt was started on PO Amoxicillin 500 mg BID on the last ER visit, Chest X ray showed :< from: Xray Chest 2 Views PA/Lat (10.27.18 @ 14:06) >Right upper paratracheal opacity is of unclear etiology. CT Chest No Contrast was ordered tonight as well as RVP,   Pt had a right posterior splint on her leg, and right splint on her wrist placed by the ED on Friday. Pt was seen By Podiatry tonight , needs pain control, PT consult, NWB , indication for surgery will be decided    later on as per Podiatry note , Pt needs Hand surgery consult as well  in AM, pt awake, A+O x 3,     Labs: Na 140, K+ 4.5, BUN 13, Creatinine 0.55, Glucose 109, LFT Normal, WBC 6.47, Hgb 14.1, Platelet 284 ,     Vitals: Tem 98.1, HR 84, /74, RR 18, 95% RA, (29 Oct 2018 22:33)    pt has no underlying pulmonary history and she was noted to have right paratracheal opacity and hence pulm called:   She is not SOB now: She did have fever after she came back from [place] tow weeks ago: Presently she is admitted with fall!      ?FOLLOWING PRESENT  [ ] Hx of PE/DVT, [ ] Hx COPD, [ ] Hx of Asthma, [ ] Hx of Hospitalization, [ ]  Hx of BiPAP/CPAP use, [ ] Hx of ROMANA    Allergies    No Known Allergies    Intolerances        PAST MEDICAL & SURGICAL HISTORY:  Osteoporosis  No significant past surgical history      FAMILY HISTORY:  Family history of colon cancer in father (Father)      Social History: [  x] TOBACCO                  [  x] ETOH                                 [x  ] IVDA/DRUGS    REVIEW OF SYSTEMS      General:	    Skin/Breast:  	  Ophthalmologic:  	  ENMT:	    Respiratory and Thorax: no cough, no sob , no phlegm  	  Cardiovascular:	    Gastrointestinal:	    Genitourinary:	    Musculoskeletal:	    Neurological:	    Psychiatric:	    Hematology/Lymphatics:	    Endocrine:	    Allergic/Immunologic:	    MEDICATIONS  (STANDING):  amoxicillin  875 milliGRAM(s)/clavulanate 1 Tablet(s) Oral two times a day  ascorbic acid 500 milliGRAM(s) Oral daily  benzocaine 15 mG/menthol 3.6 mG Lozenge 1 Lozenge Oral four times a day  calcium carbonate 1250 mG  + Vitamin D (OsCal 500 + D) 1 Tablet(s) Oral two times a day  enoxaparin Injectable 40 milliGRAM(s) SubCutaneous daily  lactobacillus acidophilus 1 Tablet(s) Oral every 12 hours    MEDICATIONS  (PRN):  acetaminophen   Tablet .. 650 milliGRAM(s) Oral every 8 hours PRN Moderate Pain (4 - 6), Severe Pain (7 - 10)       Vital Signs Last 24 Hrs  T(C): 37.2 (30 Oct 2018 10:45), Max: 37.2 (30 Oct 2018 10:45)  T(F): 99 (30 Oct 2018 10:45), Max: 99 (30 Oct 2018 10:45)  HR: 87 (30 Oct 2018 10:45) (80 - 92)  BP: 134/82 (30 Oct 2018 10:45) (129/74 - 153/99)  BP(mean): --  RR: 16 (30 Oct 2018 10:45) (16 - 19)  SpO2: 97% (30 Oct 2018 10:45) (95% - 100%)        I&O's Summary      Physical Exam:   GENERAL: NAD, well-groomed, well-developed  HEENT: SINDY/   Atraumatic, Normocephalic  ENMT: No tonsillar erythema, exudates, or enlargement; Moist mucous membranes, Good dentition, No lesions  NECK: Supple, No JVD, Normal thyroid  CHEST/LUNG: Clear to auscultation bilaterally; No rales, rhonchi, wheezing, or rubs  CVS: Regular rate and rhythm; No murmurs, rubs, or gallops  GI: : Soft, Nontender, Nondistended; Bowel sounds present  NERVOUS SYSTEM:  Alert & Oriented X3  EXTREMITIES:  RUE in cast  LYMPH: No lymphadenopathy noted  SKIN: No rashes or lesions  ENDOCRINOLOGY: No Thyromegaly  PSYCH: Appropriate    Labs:                              14.7   6.16  )-----------( 337      ( 30 Oct 2018 06:50 )             45.5                         14.1   6.47  )-----------( 284      ( 29 Oct 2018 17:20 )             43.0     10    143  |  101  |  12  ----------------------------<  90  4.6   |  26  |  0.65  10-29    140  |  102  |  13  ----------------------------<  109<H>  4.5   |  24  |  0.55    Ca    9.6      30 Oct 2018 06:50  Ca    9.2      29 Oct 2018 17:20  Phos  3.6     10-  Mg     2.6     10    TPro  8.1  /  Alb  4.0  /  TBili  0.6  /  DBili  x   /  AST  29  /  ALT  27  /  AlkPhos  78  10-  TPro  7.4  /  Alb  3.7  /  TBili  0.4  /  DBili  x   /  AST  31  /  ALT  22  /  AlkPhos  69  10-29    CAPILLARY BLOOD GLUCOSE        LIVER FUNCTIONS - ( 30 Oct 2018 06:50 )  Alb: 4.0 g/dL / Pro: 8.1 g/dL / ALK PHOS: 78 u/L / ALT: 27 u/L / AST: 29 u/L / GGT: x           PT/INR - ( 30 Oct 2018 06:50 )   PT: 10.5 SEC;   INR: 0.91          PTT - ( 30 Oct 2018 06:50 )  PTT:39.6 SEC  Urinalysis Basic - ( 30 Oct 2018 06:50 )    Color: YELLOW / Appearance: CLEAR / S.021 / pH: 6.0  Gluc: NEGATIVE / Ketone: NEGATIVE  / Bili: NEGATIVE / Urobili: NORMAL   Blood: NEGATIVE / Protein: 10 / Nitrite: NEGATIVE   Leuk Esterase: NEGATIVE / RBC: x / WBC x   Sq Epi: x / Non Sq Epi: x / Bacteria: x      D DImer< from: Xray Chest 2 Views PA/Lat (10.27.18 @ 14:06) >  EXAM:  XR CHEST PA LAT 2V        PROCEDURE DATE:  Oct 27 2018         INTERPRETATION:  CLINICAL INFORMATION: Sore throat, muscle pain, mild   fever with cough.    EXAM: Frontal radiographs of the chest.    COMPARISON: None        FINDINGS:    Rightupper paratracheal opacity is of unclear etiology. Cross-sectional   imaging is recommended for further evaluation. There are no pleural   effusions or pneumothorax.    The cardiomediastinal silhouette is poorly evaluated.    The visualized osseous and soft tissue structures demonstrate no acute   pathology.          < from: CT 3D Reconstruct w/o Workstation (10.29.18 @ 15:18) >  COMPARISON: Foot radiographs dated 10/27/2018    TECHNIQUE: CT imaging of the ankle and foot was performed. The data was   reformatted in the axial, coronal, and sagittal planes. Additionally, 3-D   reformatted imaging was created at a separate workstation.    FINDINGS:    OSSEOUS STRUCTURES: There is a comminuted and mildly impacted fracture of   the calcaneus. There is a fracture involving the posterior subtalar joint   at the calcaneal articular surface is oriented in the sagittal plane. In   addition, there is a short axis oriented fracture involving the periphery   of the lateral portion of the posterior subtalar joint. There is spurring   of the sustentaculum keren. There is extension into the calcaneocuboid   joint without significant displacement of fracture fragments. There is   extension of the fracture into the anterior process of the calcaneus no   additional fracture is identified. There is productive change at the   first metatarsophalangeal joint.  SYNOVIUM/ JOINT FLUID: There is no joint effusion.  TENDONS: The tendons are intact. No full-thickness tendon tear or   retraction is identified. Small enthesophyte formation at the Achilles   tendon insertion.  MUSCLES: There is no intramuscular hematoma.  NEUROVASCULAR STRUCTURES:Preserved  SUBCUTANEOUS SOFT TISSUES: There is soft tissue swelling about the   posterior foot. No drainable fluid collection is seen.    3-D reformatted imaging confirms these findings.    IMPRESSION:    1.  Comminuted calcaneal fracture with intra-articular extension at the   posterior subtalar joint and calcaneal cuboid joint.  2.  Comminuted and nondisplaced fracture of the anterior process of the   calcaneus.                  SOURAV LIN M.D., ATTENDING RADIOLOGIST  This document has been electronically signed. Oct 29 2018  5:11PM    < end of copied text >        DAVID SRINIVASAN M.D., RADIOLOGY RESIDENT  This document has been electronically signed.  GRACIE VITALE M.D. ATTENDING RADIOLOGIST  This document has been electronically signed. Oct 28 2018 12:07PM              < end of copied text >        Studies  Chest X-RAY  CT SCAN Chest   CT Abdomen  Venous Dopplers: LE:   Others

## 2018-10-31 ENCOUNTER — TRANSCRIPTION ENCOUNTER (OUTPATIENT)
Age: 68
End: 2018-10-31

## 2018-10-31 LAB
APTT BLD: 32.8 SEC — SIGNIFICANT CHANGE UP (ref 27.5–37.4)
BLD GP AB SCN SERPL QL: NEGATIVE — SIGNIFICANT CHANGE UP
BUN SERPL-MCNC: 10 MG/DL — SIGNIFICANT CHANGE UP (ref 7–23)
BUN SERPL-MCNC: 9 MG/DL — SIGNIFICANT CHANGE UP (ref 7–23)
CALCIUM SERPL-MCNC: 7.2 MG/DL — LOW (ref 8.4–10.5)
CALCIUM SERPL-MCNC: 7.9 MG/DL — LOW (ref 8.4–10.5)
CHLORIDE SERPL-SCNC: 110 MMOL/L — HIGH (ref 98–107)
CHLORIDE SERPL-SCNC: 110 MMOL/L — HIGH (ref 98–107)
CO2 SERPL-SCNC: 21 MMOL/L — LOW (ref 22–31)
CO2 SERPL-SCNC: 22 MMOL/L — SIGNIFICANT CHANGE UP (ref 22–31)
CREAT SERPL-MCNC: 0.46 MG/DL — LOW (ref 0.5–1.3)
CREAT SERPL-MCNC: 0.48 MG/DL — LOW (ref 0.5–1.3)
GLUCOSE SERPL-MCNC: 83 MG/DL — SIGNIFICANT CHANGE UP (ref 70–99)
GLUCOSE SERPL-MCNC: 90 MG/DL — SIGNIFICANT CHANGE UP (ref 70–99)
HCT VFR BLD CALC: 40.4 % — SIGNIFICANT CHANGE UP (ref 34.5–45)
HGB BLD-MCNC: 13.1 G/DL — SIGNIFICANT CHANGE UP (ref 11.5–15.5)
INR BLD: 1.02 — SIGNIFICANT CHANGE UP (ref 0.88–1.17)
MCHC RBC-ENTMCNC: 30.5 PG — SIGNIFICANT CHANGE UP (ref 27–34)
MCHC RBC-ENTMCNC: 32.4 % — SIGNIFICANT CHANGE UP (ref 32–36)
MCV RBC AUTO: 94.2 FL — SIGNIFICANT CHANGE UP (ref 80–100)
NRBC # FLD: 0 — SIGNIFICANT CHANGE UP
PLATELET # BLD AUTO: 326 K/UL — SIGNIFICANT CHANGE UP (ref 150–400)
PMV BLD: 10 FL — SIGNIFICANT CHANGE UP (ref 7–13)
POTASSIUM SERPL-MCNC: 3.3 MMOL/L — LOW (ref 3.5–5.3)
POTASSIUM SERPL-MCNC: 3.3 MMOL/L — LOW (ref 3.5–5.3)
POTASSIUM SERPL-SCNC: 3.3 MMOL/L — LOW (ref 3.5–5.3)
POTASSIUM SERPL-SCNC: 3.3 MMOL/L — LOW (ref 3.5–5.3)
PROTHROM AB SERPL-ACNC: 11.3 SEC — SIGNIFICANT CHANGE UP (ref 9.8–13.1)
RBC # BLD: 4.29 M/UL — SIGNIFICANT CHANGE UP (ref 3.8–5.2)
RBC # FLD: 12.8 % — SIGNIFICANT CHANGE UP (ref 10.3–14.5)
RH IG SCN BLD-IMP: POSITIVE — SIGNIFICANT CHANGE UP
SODIUM SERPL-SCNC: 143 MMOL/L — SIGNIFICANT CHANGE UP (ref 135–145)
SODIUM SERPL-SCNC: 144 MMOL/L — SIGNIFICANT CHANGE UP (ref 135–145)
WBC # BLD: 5.65 K/UL — SIGNIFICANT CHANGE UP (ref 3.8–10.5)
WBC # FLD AUTO: 5.65 K/UL — SIGNIFICANT CHANGE UP (ref 3.8–10.5)

## 2018-10-31 PROCEDURE — 73630 X-RAY EXAM OF FOOT: CPT | Mod: 26,50

## 2018-10-31 RX ORDER — HYDROMORPHONE HYDROCHLORIDE 2 MG/ML
0.5 INJECTION INTRAMUSCULAR; INTRAVENOUS; SUBCUTANEOUS
Qty: 0 | Refills: 0 | Status: DISCONTINUED | OUTPATIENT
Start: 2018-10-31 | End: 2018-10-31

## 2018-10-31 RX ORDER — LANOLIN ALCOHOL/MO/W.PET/CERES
3 CREAM (GRAM) TOPICAL ONCE
Qty: 0 | Refills: 0 | Status: COMPLETED | OUTPATIENT
Start: 2018-10-31 | End: 2018-10-31

## 2018-10-31 RX ORDER — ONDANSETRON 8 MG/1
4 TABLET, FILM COATED ORAL EVERY 4 HOURS
Qty: 0 | Refills: 0 | Status: DISCONTINUED | OUTPATIENT
Start: 2018-10-31 | End: 2018-10-31

## 2018-10-31 RX ORDER — FENTANYL CITRATE 50 UG/ML
25 INJECTION INTRAVENOUS
Qty: 0 | Refills: 0 | Status: DISCONTINUED | OUTPATIENT
Start: 2018-10-31 | End: 2018-10-31

## 2018-10-31 RX ORDER — OXYCODONE AND ACETAMINOPHEN 5; 325 MG/1; MG/1
2 TABLET ORAL EVERY 6 HOURS
Qty: 0 | Refills: 0 | Status: DISCONTINUED | OUTPATIENT
Start: 2018-10-31 | End: 2018-11-03

## 2018-10-31 RX ORDER — ACETAMINOPHEN 500 MG
650 TABLET ORAL EVERY 6 HOURS
Qty: 0 | Refills: 0 | Status: DISCONTINUED | OUTPATIENT
Start: 2018-10-31 | End: 2018-11-03

## 2018-10-31 RX ORDER — POTASSIUM CHLORIDE 20 MEQ
10 PACKET (EA) ORAL
Qty: 0 | Refills: 0 | Status: COMPLETED | OUTPATIENT
Start: 2018-10-31 | End: 2018-10-31

## 2018-10-31 RX ORDER — MORPHINE SULFATE 50 MG/1
2 CAPSULE, EXTENDED RELEASE ORAL EVERY 6 HOURS
Qty: 0 | Refills: 0 | Status: DISCONTINUED | OUTPATIENT
Start: 2018-10-31 | End: 2018-11-03

## 2018-10-31 RX ORDER — INFLUENZA VIRUS VACCINE 15; 15; 15; 15 UG/.5ML; UG/.5ML; UG/.5ML; UG/.5ML
0.5 SUSPENSION INTRAMUSCULAR ONCE
Qty: 0 | Refills: 0 | Status: DISCONTINUED | OUTPATIENT
Start: 2018-10-31 | End: 2018-11-03

## 2018-10-31 RX ADMIN — SODIUM CHLORIDE 75 MILLILITER(S): 9 INJECTION INTRAMUSCULAR; INTRAVENOUS; SUBCUTANEOUS at 06:45

## 2018-10-31 RX ADMIN — BENZOCAINE AND MENTHOL 1 LOZENGE: 5; 1 LIQUID ORAL at 06:44

## 2018-10-31 RX ADMIN — Medication 1 TABLET(S): at 19:44

## 2018-10-31 RX ADMIN — Medication 1 TABLET(S): at 06:41

## 2018-10-31 RX ADMIN — SODIUM CHLORIDE 75 MILLILITER(S): 9 INJECTION INTRAMUSCULAR; INTRAVENOUS; SUBCUTANEOUS at 17:58

## 2018-10-31 RX ADMIN — Medication 3 MILLIGRAM(S): at 01:14

## 2018-10-31 RX ADMIN — Medication 100 MILLIEQUIVALENT(S): at 17:57

## 2018-10-31 RX ADMIN — Medication 100 MILLIEQUIVALENT(S): at 19:45

## 2018-10-31 RX ADMIN — SODIUM CHLORIDE 75 MILLILITER(S): 9 INJECTION INTRAMUSCULAR; INTRAVENOUS; SUBCUTANEOUS at 00:17

## 2018-10-31 NOTE — DISCHARGE NOTE ADULT - PROVIDER TOKENS
TOKEN:'[id number]',FREE:[LAST:[Dr Orozco],PHONE:[(   )    -],FAX:[(   )    -],ADDRESS:[[phone]  Please Call for appointment]]

## 2018-10-31 NOTE — BRIEF OPERATIVE NOTE - PROCEDURE
<<-----Click on this checkbox to enter Procedure Open reduction and internal fixation (ORIF) of fracture of calcaneus  10/31/2018    Active  ALEXX

## 2018-10-31 NOTE — PROGRESS NOTE ADULT - ASSESSMENT
EKG, SR, LA enlargement  NS ST changes,   Assessment and Plan     1) Perioperative risk assessment: No further Cardiac work up needed, Denies CP or SOB with >4 mets, Compensated and no arrhythmia, no Physical exam findings suggestive of valvular HD  RCRI Class I , 0.4% risk of perioperative MACE with Rt. Ankle surgery, planned today     2) DVT PPX lovenox     3) Pain control primary team

## 2018-10-31 NOTE — DISCHARGE NOTE ADULT - PATIENT PORTAL LINK FT
You can access the [facility] Patient Portal, offered by [facility], by registering with the following website: [email or web address]

## 2018-10-31 NOTE — DISCHARGE NOTE ADULT - HOSPITAL COURSE
67 y/o female HX of Osteoporosis on Evista, No past surgery, Father  from Colon [place], Last Colonoscopy 2018 unremarkable as per pt, last Mammogram 2018 unremarkable as per pt, last PAP smear 2017, unremarkable as per pt, came with c/o mechanical fall 2 days ago.    Hospital Course:  Calcaneus fracture.    -s/p right calceneus ORIF by podiatry    Metacarpal bone fracture.    xray right hand- Displaced obliquely oriented fracture of the fifth metacarpal with associated overlying tissue swelling.   Hand Surgery consult called- splint already in place- f/u with Dr. Villa outpatient [phone]  NWB, PT consult, Fall/aspiration precaution, pain control,     Sore throat.    -augmentin  -RVP and infectious mono negative       Osteoporosis.    Hold Evista for now,  pt is High risk for DVT, + fractures    On Ca + Vit D,     Preventive measure.   DVT Prophylaxis: SQ Lovenox, 69 y/o female HX of Osteoporosis on Evista, No past surgery, Father  from Colon [place], Last Colonoscopy 2018 unremarkable as per pt, last Mammogram 2018 unremarkable as per pt, last PAP smear 2017, unremarkable as per pt, came with c/o mechanical fall 2 days ago.    Hospital Course:  Calcaneus fracture.    -s/p right calceneus ORIF by podiatry    Metacarpal bone fracture.    xray right hand- Displaced obliquely oriented fracture of the fifth metacarpal with associated overlying tissue swelling.   Hand Surgery consult called- splint already in place- f/u with Dr. Vlila outpatient [phone]  NWB, PT consult, Fall/aspiration precaution, pain control,     Sore throat.    -augmentin  -RVP and infectious mono negative       Osteoporosis.    Hold Evista for now,  pt is High risk for DVT, + fractures    On Ca + Vit D,     Preventive measure.   DVT Prophylaxis: SQ Lovenox,   Case discussed with attending, Pt Is Stable for discharge Home.

## 2018-10-31 NOTE — DISCHARGE NOTE ADULT - CARE PROVIDER_API CALL
Yogi Villa), Orthopaedic Surgery; Surgery of the Hand  [address]  [place] [address]  Phone: [phone]  Fax: [phone]    Dr Orozco,   [phone]  Please Call for appointment  Phone: (   )    -  Fax: (   )    -

## 2018-10-31 NOTE — CONSULT NOTE ADULT - SUBJECTIVE AND OBJECTIVE BOX
Patient is a 68y old  Female who presents with a chief complaint of S/P Mechanical fall 2 days ago, + Rt 5th Metacarpal FX, Rt Calcaneal FX , + sore throat, (31 Oct 2018 06:41)      HPI:    69 y/o female HX of Osteoporosis on Evista, No past surgery, Father  from Colon [place], Last Colonoscopy  unremarkable as per pt, last Mammogram 2018 unremarkable as per pt, last PAP smear , unremarkable as per pt, Recent travel to [place] returned one week ago, she stayed in [place] for 2 weeks, no sick contact in [place] as per pt,   patient  fell 2 days ago at home due to weakness and recent travel, mechanical fall, no LOC, no head trauma,  fracturing Rt  5th metacarpal and injuring and Rt  foot, and she was cared for at [facility].      Reread of Rt foot film indicated  calcaneous fx and pt was recalled for CT scan, pt also c/o  several days for  tonsillar pain, + Fever at home, sore   throat, + dry Cough, No wt Loss, no Hemoptysis, no Diaphoresis, No rash., no diarrhea, no HA, no Dizziness, no dysuria, no abdominal pain, No CP, NO SOB, pt was started on PO Amoxicillin 500 mg BID on the last ER visit, Chest X ray showed :< from: Xray Chest 2 Views PA/Lat (10.27.18 @ 14:06) >Right upper paratracheal opacity is of unclear etiology. CT Chest No Contrast was ordered tonight as well as RVP,   Pt had a right posterior splint on her leg, and right splint on her wrist placed by the ED on Friday. Pt was seen By Podiatry tonight , needs pain control, PT consult, NWB , indication for surgery will be decided    later on as per Podiatry note , Pt needs Hand surgery consult as well  in AM, pt awake, A+O x 3,     Labs: Na 140, K+ 4.5, BUN 13, Creatinine 0.55, Glucose 109, LFT Normal, WBC 6.47, Hgb 14.1, Platelet 284 ,     Vitals: Tem 98.1, HR 84, /74, RR 18, 95% RA, (29 Oct 2018 22:33)      REVIEW OF SYSTEMS:    CONSTITUTIONAL: No fever, weight loss, or fatigue  EYES: No eye pain, visual disturbances, or discharge  ENMT:  No sore throat  NECK: No pain or stiffness  RESPIRATORY: No cough, wheezing, chills or hemoptysis; No shortness of breath  CARDIOVASCULAR: No chest pain, palpitations, dizziness, or leg swelling  GASTROINTESTINAL: No abdominal or epigastric pain. No nausea, vomiting, or hematemesis; No diarrhea or constipation. No melena or hematochezia.  GENITOURINARY: No dysuria, frequency, hematuria, or incontinence  NEUROLOGICAL: No headaches, memory loss, loss of strength, numbness, or tremors  SKIN: No itching, burning, rashes, or lesions   LYMPH NODES: No enlarged glands  MUSCULOSKELETAL: No joint pain or swelling; No muscle, back, or extremity pain      PAST MEDICAL & SURGICAL HISTORY:  Osteoporosis  No significant past surgical history      Allergies    No Known Allergies    Intolerances        FAMILY HISTORY:  Family history of colon cancer in father (Father)      SOCIAL HISTORY:        MEDICATIONS  (STANDING):  amoxicillin  875 milliGRAM(s)/clavulanate 1 Tablet(s) Oral two times a day  ascorbic acid 500 milliGRAM(s) Oral daily  benzocaine 15 mG/menthol 3.6 mG Lozenge 1 Lozenge Oral four times a day  calcium carbonate 1250 mG  + Vitamin D (OsCal 500 + D) 1 Tablet(s) Oral two times a day  enoxaparin Injectable 40 milliGRAM(s) SubCutaneous daily  influenza   Vaccine 0.5 milliLiter(s) IntraMuscular once  lactobacillus acidophilus 1 Tablet(s) Oral every 12 hours  sodium chloride 0.9%. 1000 milliLiter(s) (75 mL/Hr) IV Continuous <Continuous>    MEDICATIONS  (PRN):  acetaminophen   Tablet .. 650 milliGRAM(s) Oral every 8 hours PRN Moderate Pain (4 - 6), Severe Pain (7 - 10)      Vital Signs Last 24 Hrs  T(C): 36.7 (31 Oct 2018 06:45), Max: 37.2 (30 Oct 2018 10:45)  T(F): 98 (31 Oct 2018 06:45), Max: 99 (30 Oct 2018 10:45)  HR: 76 (31 Oct 2018 06:45) (66 - 88)  BP: 152/87 (31 Oct 2018 06:45) (122/85 - 152/87)  BP(mean): --  RR: 18 (31 Oct 2018 06:45) (16 - 18)  SpO2: 98% (31 Oct 2018 06:45) (96% - 98%)    PHYSICAL EXAM:    GENERAL: NAD, well-groomed  HEAD:  Atraumatic, Normocephalic  EYES: EOMI, PERRLA, conjunctiva and sclera clear  ENMT: No tonsillar erythema, exudates, or enlargement; Moist mucous membranes  NECK: Supple, No JVD  CHEST/LUNG: Clear to percussion bilaterally; No rales, rhonchi, wheezing, or rubs  HEART: Regular rate and rhythm; No murmurs, rubs, or gallops  ABDOMEN: Soft, Nontender, Nondistended; Bowel sounds present  EXTREMITIES:  2+ Peripheral Pulses, No clubbing, cyanosis, or edema  LYMPH: No lymphadenopathy noted  SKIN: No rashes or lesions    LABS:  CBC Full  -  ( 31 Oct 2018 06:18 )  WBC Count : 5.65 K/uL  Hemoglobin : 13.1 g/dL  Hematocrit : 40.4 %  Platelet Count - Automated : 326 K/uL  Mean Cell Volume : 94.2 fL  Mean Cell Hemoglobin : 30.5 pg  Mean Cell Hemoglobin Concentration : 32.4 %  Auto Neutrophil # : x  Auto Lymphocyte # : x  Auto Monocyte # : x  Auto Eosinophil # : x  Auto Basophil # : x  Auto Neutrophil % : x  Auto Lymphocyte % : x  Auto Monocyte % : x  Auto Eosinophil % : x  Auto Basophil % : x      10-31    143  |  110<H>  |  10  ----------------------------<  83  3.3<L>   |  21<L>  |  0.46<L>    Ca    7.2<L>      31 Oct 2018 06:18  Phos  3.6     10-30  Mg     2.6     10-30    TPro  8.1  /  Alb  4.0  /  TBili  0.6  /  DBili  x   /  AST  29  /  ALT  27  /  AlkPhos  78  10-30      LIVER FUNCTIONS - ( 30 Oct 2018 06:50 )  Alb: 4.0 g/dL / Pro: 8.1 g/dL / ALK PHOS: 78 u/L / ALT: 27 u/L / AST: 29 u/L / GGT: x                               MICROBIOLOGY:    Rapid Respiratory Viral Panel (10.29. @ 23:50)    Adenovirus (RapRVP): NOT DETECTED    Influenza A (RapRVP): NOT DETECTED (any subtype)    Influenza AH1 2009 (RapRVP): NOT DETECTED    Influenza AH1 (RapRVP): NOT DETECTED    Influenza AH3 (RapRVP): NOT DETECTED    Influenza B (RapRVP): NOT DETECTED    Parainfluenza 1 (RapRVP): NOT DETECTED    Parainfluenza 2 (RapRVP): NOT DETECTED    Parainfluenza 3 (RapRVP): NOT DETECTED    Parainfluenza 4 (RapRVP): NOT DETECTED    Resp Syncytial Virus (RapRVP): NOT DETECTED    Bordetella pertussis (RapRVP): NOT DETECTED    Chlamydia pneumoniae (RapRVP): NOT DETECTED    Mycoplasma pneumoniae (RapRVP): NOT DETECTED This nucleic acid amplification assay was performed using  the [employer]. The following pathogens are tested  for: Adenovirus, Coronavirus 229E, Coronavirus HKU1,  Coronavirus NL63, Coronavirus OC43, Human Metapneumovirus  (HMPV), Rhinovirus/Enterovirus, Influenza A H1, Influenza A  H1 2009 (Pandemic H1 2009), Influenza A H3, Influenza A (Flu  A) subtype not identified, Influenza B, Parainfluenza Virus  (types 1, 2, 3, 4), Respiratory Syncytial Virus (RSV),  Bordetella pertussis, Chlamydophila pneumoniae, and  Mycoplasma pneumoniae. A negative FilmArray result does not  always exclude the possibility of viral or bacterial  infection. Laboratory results should always be interpreted  in the context of clinical findings.    Entero/Rhinovirus (RapRVP): NOT DETECTED    HKU1 Coronavirus (RapRVP): NOT DETECTED    NL63 Coronavirus (RapRVP): NOT DETECTED    229E Coronavirus (RapRVP): NOT DETECTED    OC43 Coronavirus (RapRVP): NOT DETECTED    hMPV (RapRVP): NOT DETECTED        Urinalysis Basic - ( 30 Oct 2018 06:50 )    Color: YELLOW / Appearance: CLEAR / S.021 / pH: 6.0  Gluc: NEGATIVE / Ketone: NEGATIVE  / Bili: NEGATIVE / Urobili: NORMAL   Blood: NEGATIVE / Protein: 10 / Nitrite: NEGATIVE   Leuk Esterase: NEGATIVE / RBC: x / WBC x   Sq Epi: x / Non Sq Epi: x / Bacteria: x                RADIOLOGY:    < from: CT Chest No Cont (10.30.18 @ 11:49) >  IMPRESSION:    When compared with recent chest x-ray of 2018:    There is no mediastinal mass identified. The apparent opacity seen on   recent chest x-ray was due to superimposition of normal structures. No   further follow-up is required for this finding.    < end of copied text >        < from: Xray Calcaneus, Right (10.29.18 @ 18:05) >  IMPRESSION:     Status post splinting for comminuted calcaneal fracture with   intra-articular extension.  Rest of the fine osseous detail is limited by splinting material.     < end of copied text > Patient is a 68y old  Female who presents with a chief complaint of S/P Mechanical fall 2 days ago, + Rt 5th Metacarpal FX, Rt Calcaneal FX , + sore throat, (31 Oct 2018 06:41)      HPI:    67 y/o female HX of Osteoporosis on Evista, No past surgery, Father  from Colon [place], Last Colonoscopy  unremarkable as per pt, last Mammogram 2018 unremarkable as per pt, last PAP smear , unremarkable as per pt, Recent travel to [place] returned one week ago, she stayed in [place] for 2 weeks, no sick contact in [place] as per pt,   patient  fell 2 days ago at home due to weakness and recent travel, mechanical fall, no LOC, no head trauma,  fracturing Rt  5th metacarpal and injuring and Rt  foot, and she was cared for at [facility].      Reread of Rt foot film indicated  calcaneous fx and pt was recalled for CT scan, pt also c/o  several days for  tonsillar pain, + Fever at home, sore   throat, + dry Cough, No wt Loss, no Hemoptysis, no Diaphoresis, No rash., no diarrhea, no HA, no Dizziness, no dysuria, no abdominal pain, No CP, NO SOB, pt was started on PO Amoxicillin 500 mg BID on the last ER visit, Chest X ray showed :< from: Xray Chest 2 Views PA/Lat (10.27.18 @ 14:06) >Right upper paratracheal opacity is of unclear etiology. CT Chest No Contrast was ordered tonight as well as RVP,   Pt had a right posterior splint on her leg, and right splint on her wrist placed by the ED on Friday. Pt was seen By Podiatry tonight , needs pain control, PT consult, NWB , indication for surgery will be decided    later on as per Podiatry note , Pt needs Hand surgery consult as well  in AM, pt awake, A+O x 3,     Labs: Na 140, K+ 4.5, BUN 13, Creatinine 0.55, Glucose 109, LFT Normal, WBC 6.47, Hgb 14.1, Platelet 284 ,     Vitals: Tem 98.1, HR 84, /74, RR 18, 95% RA, (29 Oct 2018 22:33)      REVIEW OF SYSTEMS:    CONSTITUTIONAL: No fever, weight loss, or fatigue  EYES: No eye pain, visual disturbances, or discharge  ENMT:  No sore throat  NECK: No pain or stiffness  RESPIRATORY: No cough, wheezing, chills or hemoptysis; No shortness of breath  CARDIOVASCULAR: No chest pain, palpitations, dizziness, or leg swelling  GASTROINTESTINAL: No abdominal or epigastric pain. No nausea, vomiting, or hematemesis; No diarrhea or constipation. No melena or hematochezia.  GENITOURINARY: No dysuria, frequency, hematuria, or incontinence  NEUROLOGICAL: No headaches, memory loss, loss of strength, numbness, or tremors  SKIN: No itching, burning, rashes, or lesions   LYMPH NODES: No enlarged glands  MUSCULOSKELETAL: No joint pain or swelling; No muscle, back, or extremity pain      PAST MEDICAL & SURGICAL HISTORY:  Osteoporosis  No significant past surgical history      Allergies    No Known Allergies    Intolerances        FAMILY HISTORY:  Family history of colon cancer in father (Father)      SOCIAL HISTORY:  , no Children, No Smoking, NO ETOH, Retired Principal      MEDICATIONS  (STANDING):  amoxicillin  875 milliGRAM(s)/clavulanate 1 Tablet(s) Oral two times a day  ascorbic acid 500 milliGRAM(s) Oral daily  benzocaine 15 mG/menthol 3.6 mG Lozenge 1 Lozenge Oral four times a day  calcium carbonate 1250 mG  + Vitamin D (OsCal 500 + D) 1 Tablet(s) Oral two times a day  enoxaparin Injectable 40 milliGRAM(s) SubCutaneous daily  influenza   Vaccine 0.5 milliLiter(s) IntraMuscular once  lactobacillus acidophilus 1 Tablet(s) Oral every 12 hours  sodium chloride 0.9%. 1000 milliLiter(s) (75 mL/Hr) IV Continuous <Continuous>    MEDICATIONS  (PRN):  acetaminophen   Tablet .. 650 milliGRAM(s) Oral every 8 hours PRN Moderate Pain (4 - 6), Severe Pain (7 - 10)      Vital Signs Last 24 Hrs  T(C): 36.7 (31 Oct 2018 06:45), Max: 37.2 (30 Oct 2018 10:45)  T(F): 98 (31 Oct 2018 06:45), Max: 99 (30 Oct 2018 10:45)  HR: 76 (31 Oct 2018 06:45) (66 - 88)  BP: 152/87 (31 Oct 2018 06:45) (122/85 - 152/87)  BP(mean): --  RR: 18 (31 Oct 2018 06:45) (16 - 18)  SpO2: 98% (31 Oct 2018 06:45) (96% - 98%)    PHYSICAL EXAM:    GENERAL: NAD, well-groomed  HEAD:  Atraumatic, Normocephalic  EYES: EOMI, PERRLA, conjunctiva and sclera clear  ENMT: No tonsillar erythema, exudates, or enlargement; Moist mucous membranes  NECK: Supple, No JVD  CHEST/LUNG: Clear to percussion bilaterally; No rales, rhonchi, wheezing, or rubs  HEART: Regular rate and rhythm; No murmurs, rubs, or gallops  ABDOMEN: Soft, Nontender, Nondistended; Bowel sounds present  EXTREMITIES:  2+ Peripheral Pulses, No clubbing, cyanosis, or edema  LYMPH: No lymphadenopathy noted  SKIN: No rashes or lesions    LABS:  CBC Full  -  ( 31 Oct 2018 06:18 )  WBC Count : 5.65 K/uL  Hemoglobin : 13.1 g/dL  Hematocrit : 40.4 %  Platelet Count - Automated : 326 K/uL  Mean Cell Volume : 94.2 fL  Mean Cell Hemoglobin : 30.5 pg  Mean Cell Hemoglobin Concentration : 32.4 %  Auto Neutrophil # : x  Auto Lymphocyte # : x  Auto Monocyte # : x  Auto Eosinophil # : x  Auto Basophil # : x  Auto Neutrophil % : x  Auto Lymphocyte % : x  Auto Monocyte % : x  Auto Eosinophil % : x  Auto Basophil % : x      10-31    143  |  110<H>  |  10  ----------------------------<  83  3.3<L>   |  21<L>  |  0.46<L>    Ca    7.2<L>      31 Oct 2018 06:18  Phos  3.6     10-30  Mg     2.6     10-30    TPro  8.1  /  Alb  4.0  /  TBili  0.6  /  DBili  x   /  AST  29  /  ALT  27  /  AlkPhos  78  10-30      LIVER FUNCTIONS - ( 30 Oct 2018 06:50 )  Alb: 4.0 g/dL / Pro: 8.1 g/dL / ALK PHOS: 78 u/L / ALT: 27 u/L / AST: 29 u/L / GGT: x                               MICROBIOLOGY:    Rapid Respiratory Viral Panel (10.29. @ 23:50)    Adenovirus (RapRVP): NOT DETECTED    Influenza A (RapRVP): NOT DETECTED (any subtype)    Influenza AH1 2009 (RapRVP): NOT DETECTED    Influenza AH1 (RapRVP): NOT DETECTED    Influenza AH3 (RapRVP): NOT DETECTED    Influenza B (RapRVP): NOT DETECTED    Parainfluenza 1 (RapRVP): NOT DETECTED    Parainfluenza 2 (RapRVP): NOT DETECTED    Parainfluenza 3 (RapRVP): NOT DETECTED    Parainfluenza 4 (RapRVP): NOT DETECTED    Resp Syncytial Virus (RapRVP): NOT DETECTED    Bordetella pertussis (RapRVP): NOT DETECTED    Chlamydia pneumoniae (RapRVP): NOT DETECTED    Mycoplasma pneumoniae (RapRVP): NOT DETECTED This nucleic acid amplification assay was performed using  the [employer]. The following pathogens are tested  for: Adenovirus, Coronavirus 229E, Coronavirus HKU1,  Coronavirus NL63, Coronavirus OC43, Human Metapneumovirus  (HMPV), Rhinovirus/Enterovirus, Influenza A H1, Influenza A  H1 2009 (Pandemic H1 2009), Influenza A H3, Influenza A (Flu  A) subtype not identified, Influenza B, Parainfluenza Virus  (types 1, 2, 3, 4), Respiratory Syncytial Virus (RSV),  Bordetella pertussis, Chlamydophila pneumoniae, and  Mycoplasma pneumoniae. A negative FilmArray result does not  always exclude the possibility of viral or bacterial  infection. Laboratory results should always be interpreted  in the context of clinical findings.    Entero/Rhinovirus (RapRVP): NOT DETECTED    HKU1 Coronavirus (RapRVP): NOT DETECTED    NL63 Coronavirus (RapRVP): NOT DETECTED    229E Coronavirus (RapRVP): NOT DETECTED    OC43 Coronavirus (RapRVP): NOT DETECTED    hMPV (RapRVP): NOT DETECTED        Urinalysis Basic - ( 30 Oct 2018 06:50 )    Color: YELLOW / Appearance: CLEAR / S.021 / pH: 6.0  Gluc: NEGATIVE / Ketone: NEGATIVE  / Bili: NEGATIVE / Urobili: NORMAL   Blood: NEGATIVE / Protein: 10 / Nitrite: NEGATIVE   Leuk Esterase: NEGATIVE / RBC: x / WBC x   Sq Epi: x / Non Sq Epi: x / Bacteria: x                RADIOLOGY:    < from: CT Chest No Cont (10.30.18 @ 11:49) >  IMPRESSION:    When compared with recent chest x-ray of 2018:    There is no mediastinal mass identified. The apparent opacity seen on   recent chest x-ray was due to superimposition of normal structures. No   further follow-up is required for this finding.    < end of copied text >        < from: Xray Calcaneus, Right (10.29.18 @ 18:05) >  IMPRESSION:     Status post splinting for comminuted calcaneal fracture with   intra-articular extension.  Rest of the fine osseous detail is limited by splinting material.     < end of copied text >

## 2018-10-31 NOTE — PROGRESS NOTE ADULT - ASSESSMENT
69 y/o female HX of Osteoporosis on Evista, No past surgery, Father  from Colon [place], Last Colonoscopy  unremarkable as per pt, last Mammogram 2018 unremarkable as per pt, last PAP smear 2017, unremarkable as per pt, Recent travel to [place] returned one week ago, she stayed in [place] for 2 weeks, no sick contact in [place] as per pt,   patient  fell 2 days ago at home due to weakness and recent travel, mechanical fall, no LOC, no head trauma,  fracturing Rt  5th metacarpal and injuring and Rt  foot, and she was cared for at [facility],  reread of Rt foot film indicated  calcaneous fx and pt was recalled for CT scan, pt also c/o  several days for  tonsillar pain, + Fever at home, sore   throat, + dry Cough, No wt Loss, no Hemoptysis, no Diaphoresis, No rash., no diarrhea, no HA, no Dizziness, no dysuria, no abdominal pain, No CP, NO SOB, pt was started on PO Amoxicillin 500 mg BID on the last ER visit, Chest X ray showed :< from: Xray Chest 2 Views PA/Lat (10.27.18 @ 14:06) >Right upper paratracheal opacity is of unclear etiology. CT Chest No Contrast was ordered tonight as well as RVP,   Pt had a right posterior splint on her leg, and right splint on her wrist placed by the ED on Friday. Pt was seen By Podiatry tonight , needs pain control, PT consult, NWB , indication for surgery will be decided    later on as per Podiatry note , Pt needs Hand surgery consult as well  in AM, pt awake, A+O x 3,

## 2018-10-31 NOTE — PROGRESS NOTE ADULT - ASSESSMENT
Problem/Plan - 1:  ·  Problem: Calcaneus fracture.  Plan: Podiatry F/U, Bed rest, PT consult, NWB Rt Leg/Foot, Pain control, Fall/aspiration precaution,     Problem/Plan - 2:  ·  Problem: Metacarpal bone fracture.  Plan: Hand Surgery consult in AM, NWB, PT consult, Fall/aspiration precaution, pain control,     Problem/Plan - 3:  ·  Problem: Sore throat.  Plan: recent Travel to [place], RVP, PO Augmentin, Bacid, HIV Test, Mono Spot, EBV, pt has been on PO Amoxicillin as outpatient for the past 2 days, Consider ENT Consult if NO Improvement, Cepacol for pain, Hep A,B,C  profile, F/U CBC, CMP, No wt Loss, fever at home?, + Dry Cough x one week, no sick contact? No Hemoptysis, no diaphoresis, No night Sweets, No Concern for TB at this ponit , waiting for CT Chest no contrast, No fever  in [facility], R/O strep Throat, no Yield for Throat Culture since pt has been on PO Amoxicillin as outpatient x 2 days,     Problem/Plan - 4:  ·  Problem: Abnormal chest x-ray.  Plan: CT Chest No contrast,  As per Chest X Ray:  Right upper paratracheal opacity?     Problem/Plan - 5:  ·  Problem: Osteoporosis.  Plan: Hold Evista for now,  pt is High risk for DVT, + fractures ,   On Ca + Vit D,

## 2018-10-31 NOTE — DISCHARGE NOTE ADULT - ADDITIONAL INSTRUCTIONS
Podiatry Discharge Instructions:  Follow up: Please follow up with Dr. Orozco within 1 week of discharge from the hospital, please call [phone] for appointment and discuss that you recently were seen in the hospital. Address is [address] [place] [address] [address], [facility].  Wound Care: Please leave posterior splint and dressing clean dry intact until your follow up appointment.  Weight bearing: Non-weight bearing in a wheelchair.  Antibiotics: No antibiotics per podiatry. Podiatry Discharge Instructions:  Follow up: Please follow up with Dr. Orozco within 1 week of discharge from the hospital, please call [phone] for appointment and discuss that you recently were seen in the hospital. Address is [address] [place] [address] [address].  Wound Care: Please leave posterior splint and dressing clean dry intact until your follow up appointment.  Weight bearing: Non-weight bearing in a wheelchair.  Antibiotics: No antibiotics per podiatry.     Please follow  with Dr. Villa (hand surgery) outpatient within 1-2 weeks of discharge. Please call [phone] to make appointment.

## 2018-10-31 NOTE — CONSULT NOTE ADULT - ASSESSMENT
Patient complaining of increased nasal congestion.  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  Will send prescription for Tamiflu today.  75 mg po bid for 5 days.    67 y/o female HX of Osteoporosis on Evista, Recent travel to [place] returned one week ago,  patient  fell 2 days ago at home due to weakness and recent travel, mechanical fall, no LOC, no head trauma,  fracturing Rt  5th metacarpal and injuring and Rt  foot, and she was cared for at [facility].       recalled for CT scan, pt also c/o  several days for  tonsillar pain, + Fever at home, sore   throat, + dry Cough, No wt Loss, no Hemoptysis, no Diaphoresis, No rash., no diarrhea, no HA, no Dizziness, no dysuria, no abdominal pain, No CP, NO SOB, pt was started on PO Amoxicillin 500 mg BID on the last ER visit, Chest X ray showed :< from: Xray Chest 2 Views PA/Lat (10.27.18 @ 14:06) >Right upper paratracheal opacity is of unclear etiology. CT Chest No Contrast was ordered tonight as well as RVP,   Pt had a right posterior splint on her leg, and right splint on her wrist placed by the ED on Friday. Pt was seen By Podiatry tonight , needs pain control, PT consult, NWB ,    Pt being evaluated by Hand surgery and Podiatry.    Labs: Na 140, K+ 4.5, BUN 13, Creatinine 0.55, Glucose 109, LFT Normal, WBC 6.47, Hgb 14.1, Platelet 284 ,     Vitals: Tem 98.1, HR 84, /74, RR 18, 95% RA, (29 Oct 2018 22:33)    Recommend:    - Pt with (+) sore throat.  Thus far RVP (-).  EBV serologies indicative of prior exposure.  Infectious mononucleosis screen neg.  HIV neg.  CT chest without mediastinal mass or acute pathology.  Complete course of Augmentin    - Check CMV serologies.      - Monitor for new fevers or worsening leukocytosis.  Pt planned for OR of R calcaneal ORIF.    Will follow,    Spring Mcnamara  [phone]

## 2018-10-31 NOTE — DISCHARGE NOTE ADULT - MEDICATION SUMMARY - MEDICATIONS TO STOP TAKING
I will STOP taking the medications listed below when I get home from the hospital:    amoxicillin 500 mg oral tablet  -- 1 tab(s) by mouth 2 times a day   -- Finish all this medication unless otherwise directed by prescriber.

## 2018-10-31 NOTE — DISCHARGE NOTE ADULT - CARE PLAN
Principal Discharge DX:	Fall  Goal:	To prevent or reduce the incidence of falls and injuries.  To increase mobility and function and environmental safety.  Assessment and plan of treatment:	Fall precautions: keep your area clutter free, place night lights in hallways and stairwells, and add non-slip mats in the kitchen and bathrooms.  Exercise daily to improve muscle strength to avoid deconditioning.  Secondary Diagnosis:	Calcaneal fracture  Assessment and plan of treatment:	post splint applied  -keep dressing clean, dry and intact  -keep foot elevated  Secondary Diagnosis:	Metacarpal bone fracture  Assessment and plan of treatment:	encourage ROM of all fingers and digits including affected 5th digit to prevent stiffness  - Partial WB right upper extremity for crutch balance  - keep splint clean dry intact  - rest ice elevate affected hand  - please follow up with Dr. Villa in office in 1-2 weeks from discharge [phone]  Secondary Diagnosis:	Tonsillitis  Goal:	Completion of antibiotics  Assessment and plan of treatment:	Follow up with Your PMD in 1-2 weeks  Secondary Diagnosis:	Osteoporosis

## 2018-10-31 NOTE — DISCHARGE NOTE ADULT - MEDICATION SUMMARY - MEDICATIONS TO TAKE
I will START or STAY ON the medications listed below when I get home from the hospital:    oxyCODONE-acetaminophen 5 mg-325 mg oral tablet  -- 2 tab(s) by mouth every 6 hours, As needed, Moderate Pain (4 - 6)  -- Indication: For Pain    oxyCODONE-acetaminophen 5 mg-325 mg oral tablet  -- 1 tab(s) by mouth every 6 hours, As needed, Mild Pain (1 - 3)  -- Indication: For Pain    Evista 60 mg oral tablet  -- 1 tab(s) by mouth once a day  -- Indication: For Bone    amoxicillin-clavulanate 875 mg-125 mg oral tablet  -- 1 tab(s) by mouth 2 times a day Until 11/8, Then Stop  -- Indication: For tonsiliitis    lactobacillus acidophilus oral capsule  -- orally 2 times a day  -- Indication: For Gi Prophylaxis    calcium-vitamin D 500 mg-200 intl units oral tablet  -- 1 tab(s) by mouth 2 times a day  -- Indication: For Supplement    ascorbic acid 500 mg oral tablet  -- 1 tab(s) by mouth once a day  -- Indication: For Supplement I will START or STAY ON the medications listed below when I get home from the hospital:    oxyCODONE-acetaminophen 5 mg-325 mg oral tablet  -- 2 tab(s) by mouth every 6 hours, As needed, Moderate Pain (4 - 6)  -- Indication: For Pain    oxyCODONE-acetaminophen 5 mg-325 mg oral tablet  -- 1 tab(s) by mouth every 6 hours, As needed, Mild Pain (1 - 3)  -- Indication: For Pain    Lovenox 40 mg/0.4 mL injectable solution  -- 40 unit(s) subcutaneous once a day x 3 more Days as per Podiatry  -- Indication: For DVT Prophylaxis    amoxicillin-clavulanate 875 mg-125 mg oral tablet  -- 1 tab(s) by mouth 2 times a day Until 11/8, Then Stop  -- Indication: For tonsiliitis    lactobacillus acidophilus oral capsule  -- orally 2 times a day  -- Indication: For Gi Prophylaxis    calcium-vitamin D 500 mg-200 intl units oral tablet  -- 1 tab(s) by mouth 2 times a day  -- Indication: For Supplement    ascorbic acid 500 mg oral tablet  -- 1 tab(s) by mouth once a day  -- Indication: For Supplement

## 2018-10-31 NOTE — DISCHARGE NOTE ADULT - PLAN OF CARE
To prevent or reduce the incidence of falls and injuries.  To increase mobility and function and environmental safety. Fall precautions: keep your area clutter free, place night lights in hallways and stairwells, and add non-slip mats in the kitchen and bathrooms.  Exercise daily to improve muscle strength to avoid deconditioning. post splint applied  -keep dressing clean, dry and intact  -keep foot elevated encourage ROM of all fingers and digits including affected 5th digit to prevent stiffness  - Partial WB right upper extremity for crutch balance  - keep splint clean dry intact  - rest ice elevate affected hand  - please follow up with Dr. Villa in office in 1-2 weeks from discharge [phone] Completion of antibiotics Follow up with Your PMD in 1-2 weeks

## 2018-11-01 LAB
BUN SERPL-MCNC: 9 MG/DL — SIGNIFICANT CHANGE UP (ref 7–23)
CALCIUM SERPL-MCNC: 8.1 MG/DL — LOW (ref 8.4–10.5)
CHLORIDE SERPL-SCNC: 104 MMOL/L — SIGNIFICANT CHANGE UP (ref 98–107)
CMV IGG FLD QL: >10 U/ML — HIGH
CMV IGG SERPL-IMP: POSITIVE — SIGNIFICANT CHANGE UP
CMV IGM FLD-ACNC: <8 AU/ML — SIGNIFICANT CHANGE UP
CMV IGM SERPL QL: NEGATIVE — SIGNIFICANT CHANGE UP
CO2 SERPL-SCNC: 22 MMOL/L — SIGNIFICANT CHANGE UP (ref 22–31)
CREAT SERPL-MCNC: 0.58 MG/DL — SIGNIFICANT CHANGE UP (ref 0.5–1.3)
GLUCOSE SERPL-MCNC: 88 MG/DL — SIGNIFICANT CHANGE UP (ref 70–99)
HCT VFR BLD CALC: 41.4 % — SIGNIFICANT CHANGE UP (ref 34.5–45)
HGB BLD-MCNC: 13.5 G/DL — SIGNIFICANT CHANGE UP (ref 11.5–15.5)
MCHC RBC-ENTMCNC: 30.6 PG — SIGNIFICANT CHANGE UP (ref 27–34)
MCHC RBC-ENTMCNC: 32.6 % — SIGNIFICANT CHANGE UP (ref 32–36)
MCV RBC AUTO: 93.9 FL — SIGNIFICANT CHANGE UP (ref 80–100)
NRBC # FLD: 0 — SIGNIFICANT CHANGE UP
PLATELET # BLD AUTO: 374 K/UL — SIGNIFICANT CHANGE UP (ref 150–400)
PMV BLD: 9.7 FL — SIGNIFICANT CHANGE UP (ref 7–13)
POTASSIUM SERPL-MCNC: 3.8 MMOL/L — SIGNIFICANT CHANGE UP (ref 3.5–5.3)
POTASSIUM SERPL-SCNC: 3.8 MMOL/L — SIGNIFICANT CHANGE UP (ref 3.5–5.3)
RBC # BLD: 4.41 M/UL — SIGNIFICANT CHANGE UP (ref 3.8–5.2)
RBC # FLD: 12.8 % — SIGNIFICANT CHANGE UP (ref 10.3–14.5)
SODIUM SERPL-SCNC: 139 MMOL/L — SIGNIFICANT CHANGE UP (ref 135–145)
WBC # BLD: 11.48 K/UL — HIGH (ref 3.8–10.5)
WBC # FLD AUTO: 11.48 K/UL — HIGH (ref 3.8–10.5)

## 2018-11-01 PROCEDURE — 73630 X-RAY EXAM OF FOOT: CPT | Mod: 26,RT

## 2018-11-01 PROCEDURE — 73120 X-RAY EXAM OF HAND: CPT | Mod: 26,52,RT

## 2018-11-01 RX ORDER — OXYCODONE AND ACETAMINOPHEN 5; 325 MG/1; MG/1
1 TABLET ORAL EVERY 6 HOURS
Qty: 0 | Refills: 0 | Status: DISCONTINUED | OUTPATIENT
Start: 2018-11-01 | End: 2018-11-03

## 2018-11-01 RX ADMIN — Medication 650 MILLIGRAM(S): at 13:15

## 2018-11-01 RX ADMIN — BENZOCAINE AND MENTHOL 1 LOZENGE: 5; 1 LIQUID ORAL at 01:16

## 2018-11-01 RX ADMIN — BENZOCAINE AND MENTHOL 1 LOZENGE: 5; 1 LIQUID ORAL at 17:44

## 2018-11-01 RX ADMIN — BENZOCAINE AND MENTHOL 1 LOZENGE: 5; 1 LIQUID ORAL at 05:59

## 2018-11-01 RX ADMIN — Medication 650 MILLIGRAM(S): at 12:27

## 2018-11-01 RX ADMIN — Medication 1 TABLET(S): at 17:43

## 2018-11-01 RX ADMIN — BENZOCAINE AND MENTHOL 1 LOZENGE: 5; 1 LIQUID ORAL at 12:16

## 2018-11-01 RX ADMIN — Medication 1 TABLET(S): at 17:45

## 2018-11-01 RX ADMIN — OXYCODONE AND ACETAMINOPHEN 2 TABLET(S): 5; 325 TABLET ORAL at 17:00

## 2018-11-01 RX ADMIN — Medication 500 MILLIGRAM(S): at 12:12

## 2018-11-01 RX ADMIN — Medication 1 TABLET(S): at 05:58

## 2018-11-01 RX ADMIN — ENOXAPARIN SODIUM 40 MILLIGRAM(S): 100 INJECTION SUBCUTANEOUS at 12:13

## 2018-11-01 RX ADMIN — OXYCODONE AND ACETAMINOPHEN 2 TABLET(S): 5; 325 TABLET ORAL at 16:19

## 2018-11-01 RX ADMIN — Medication 1 TABLET(S): at 06:49

## 2018-11-01 NOTE — PROVIDER CONTACT NOTE (OTHER) - ASSESSMENT
Patient post open reduction of right metacarpal with cast revealed upon assessment that she had numbness of right leg. Patient denied sensation with touch. Capillary refill noted in right foot.

## 2018-11-01 NOTE — PROGRESS NOTE ADULT - ASSESSMENT
EKG, SR, LA enlargement  NS ST changes,   Assessment and Plan     1) Perioperative risk assessment: tolerated the procedure well, Denies post OP CP, SOB, Hemodynamics stable     2) DVT PPX lovenox     3) URI : on abx ID on board

## 2018-11-01 NOTE — PROVIDER CONTACT NOTE (OTHER) - ACTION/TREATMENT ORDERED:
No actions ordered.
24 F no pmh, p/w throat pain, nasal congestion and L ear pain since last night, well appearing, throat injected with no exudates likely viral uri to d.c home.

## 2018-11-01 NOTE — PROGRESS NOTE ADULT - ASSESSMENT
Problem/Plan - 1:  ·  Problem: Calcaneus fracture.  Plan: Podiatry F/U, Bed rest, PT consult, NWB Rt Leg/Foot, Pain control, Fall/aspiration precaution,     Problem/Plan - 2:  ·  Problem: Metacarpal bone fracture.  Plan: Hand Surgery consult in AM, NWB, PT consult, Fall/aspiration precaution, pain control,  hand surgery fu      Problem/Plan - 3:  ·  Problem: Sore throat.  Plan: pulmonary fu    Problem/Plan - 4:  ·  Problem: Abnormal chest x-ray.  Plan: CT Chest reviewed  pulmonary fu    Problem/Plan - 5:  ·  Problem: Osteoporosis.  Plan: Hold Evista for now,  pt is High risk for DVT, + fractures ,   On Ca + Vit D,

## 2018-11-01 NOTE — PROGRESS NOTE ADULT - ASSESSMENT
67 y/o female HX of Osteoporosis on Evista, Recent travel to [place] returned one week ago,  patient  fell 2 days ago at home due to weakness and recent travel, mechanical fall, no LOC, no head trauma,  fracturing Rt  5th metacarpal and injuring and Rt  foot, and she was cared for at [facility].       recalled for CT scan, pt also c/o  several days for  tonsillar pain, + Fever at home, sore   throat, + dry Cough, No wt Loss, no Hemoptysis, no Diaphoresis, No rash., no diarrhea, no HA, no Dizziness, no dysuria, no abdominal pain, No CP, NO SOB, pt was started on PO Amoxicillin 500 mg BID on the last ER visit, Chest X ray showed :< from: Xray Chest 2 Views PA/Lat (10.27.18 @ 14:06) >Right upper paratracheal opacity is of unclear etiology. CT Chest No Contrast was ordered tonight as well as RVP,   Pt had a right posterior splint on her leg, and right splint on her wrist placed by the ED on Friday. Pt was seen By Podiatry tonight , needs pain control, PT consult, NWB ,    Pt being evaluated by Hand surgery and Podiatry.    Labs: Na 140, K+ 4.5, BUN 13, Creatinine 0.55, Glucose 109, LFT Normal, WBC 6.47, Hgb 14.1, Platelet 284 ,     Vitals: Tem 98.1, HR 84, /74, RR 18, 95% RA, (29 Oct 2018 22:33)    Recommend:    - Pt with (+) sore throat.  Thus far RVP (-).  EBV serologies indicative of prior exposure.  Infectious mononucleosis screen neg.  HIV neg.  CT chest without mediastinal mass or acute pathology.  Complete course of Augmentin x 10 days (10/29 through 11/8) for tonsillitis.    - Check CMV serologies.      - Monitor for new fevers or worsening leukocytosis.  Pt POD #1 of R calcaneal ORIF    Will follow,    Spring Mcnamara  [phone]

## 2018-11-01 NOTE — PROGRESS NOTE ADULT - ASSESSMENT
67yo F s/p calcaneal ORIF  ·	Pt seen evaluated  ·	Splint left clean dry intact, pt in no acute pain at this time  ·	No strikethrough noted  ·	Pt stable for discharge per podiatry, will require rehab per PT (has requested Orzac possibly)  ·	d/w attending

## 2018-11-02 LAB
BUN SERPL-MCNC: 9 MG/DL — SIGNIFICANT CHANGE UP (ref 7–23)
CALCIUM SERPL-MCNC: 8.8 MG/DL — SIGNIFICANT CHANGE UP (ref 8.4–10.5)
CHLORIDE SERPL-SCNC: 103 MMOL/L — SIGNIFICANT CHANGE UP (ref 98–107)
CO2 SERPL-SCNC: 24 MMOL/L — SIGNIFICANT CHANGE UP (ref 22–31)
CREAT SERPL-MCNC: 0.56 MG/DL — SIGNIFICANT CHANGE UP (ref 0.5–1.3)
GLUCOSE SERPL-MCNC: 101 MG/DL — HIGH (ref 70–99)
HCT VFR BLD CALC: 39.1 % — SIGNIFICANT CHANGE UP (ref 34.5–45)
HGB BLD-MCNC: 13 G/DL — SIGNIFICANT CHANGE UP (ref 11.5–15.5)
MCHC RBC-ENTMCNC: 31 PG — SIGNIFICANT CHANGE UP (ref 27–34)
MCHC RBC-ENTMCNC: 33.2 % — SIGNIFICANT CHANGE UP (ref 32–36)
MCV RBC AUTO: 93.3 FL — SIGNIFICANT CHANGE UP (ref 80–100)
NRBC # FLD: 0 — SIGNIFICANT CHANGE UP
PLATELET # BLD AUTO: 394 K/UL — SIGNIFICANT CHANGE UP (ref 150–400)
PMV BLD: 9.9 FL — SIGNIFICANT CHANGE UP (ref 7–13)
POTASSIUM SERPL-MCNC: 3.8 MMOL/L — SIGNIFICANT CHANGE UP (ref 3.5–5.3)
POTASSIUM SERPL-SCNC: 3.8 MMOL/L — SIGNIFICANT CHANGE UP (ref 3.5–5.3)
RBC # BLD: 4.19 M/UL — SIGNIFICANT CHANGE UP (ref 3.8–5.2)
RBC # FLD: 12.9 % — SIGNIFICANT CHANGE UP (ref 10.3–14.5)
SODIUM SERPL-SCNC: 140 MMOL/L — SIGNIFICANT CHANGE UP (ref 135–145)
WBC # BLD: 9.83 K/UL — SIGNIFICANT CHANGE UP (ref 3.8–10.5)
WBC # FLD AUTO: 9.83 K/UL — SIGNIFICANT CHANGE UP (ref 3.8–10.5)

## 2018-11-02 PROCEDURE — 73130 X-RAY EXAM OF HAND: CPT | Mod: 26,RT

## 2018-11-02 RX ADMIN — Medication 500 MILLIGRAM(S): at 13:05

## 2018-11-02 RX ADMIN — Medication 1 TABLET(S): at 17:59

## 2018-11-02 RX ADMIN — OXYCODONE AND ACETAMINOPHEN 1 TABLET(S): 5; 325 TABLET ORAL at 04:02

## 2018-11-02 RX ADMIN — Medication 1 TABLET(S): at 05:21

## 2018-11-02 RX ADMIN — OXYCODONE AND ACETAMINOPHEN 1 TABLET(S): 5; 325 TABLET ORAL at 03:02

## 2018-11-02 RX ADMIN — Medication 650 MILLIGRAM(S): at 01:04

## 2018-11-02 RX ADMIN — Medication 1 TABLET(S): at 18:00

## 2018-11-02 RX ADMIN — ENOXAPARIN SODIUM 40 MILLIGRAM(S): 100 INJECTION SUBCUTANEOUS at 13:04

## 2018-11-02 RX ADMIN — Medication 650 MILLIGRAM(S): at 18:40

## 2018-11-02 RX ADMIN — Medication 650 MILLIGRAM(S): at 00:04

## 2018-11-02 RX ADMIN — Medication 650 MILLIGRAM(S): at 18:01

## 2018-11-02 RX ADMIN — SODIUM CHLORIDE 75 MILLILITER(S): 9 INJECTION INTRAMUSCULAR; INTRAVENOUS; SUBCUTANEOUS at 00:05

## 2018-11-02 NOTE — CONSULT NOTE ADULT - REASON FOR ADMISSION
S/P Mechanical fall 2 days ago, + Rt 5th Metacarpal FX, Rt Calcaneal FX , + sore throat,

## 2018-11-02 NOTE — PROGRESS NOTE ADULT - ASSESSMENT
s/p R calcaneal ORIF  -seen and evaluated  -post splint applied  -keep dressing clean, dry and intact  -keep foot elevated  -follow up final PT recs  -DVT ppx  -stable for dc

## 2018-11-02 NOTE — PROGRESS NOTE ADULT - ASSESSMENT
67 y/o female HX of Osteoporosis on Evista, Recent travel to [place] returned one week ago,  patient  fell 2 days ago at home due to weakness and recent travel, mechanical fall, no LOC, no head trauma,  fracturing Rt  5th metacarpal and injuring and Rt  foot, and she was cared for at [facility].       recalled for CT scan, pt also c/o  several days for  tonsillar pain, + Fever at home, sore   throat, + dry Cough, No wt Loss, no Hemoptysis, no Diaphoresis, No rash., no diarrhea, no HA, no Dizziness, no dysuria, no abdominal pain, No CP, NO SOB, pt was started on PO Amoxicillin 500 mg BID on the last ER visit, Chest X ray showed :< from: Xray Chest 2 Views PA/Lat (10.27.18 @ 14:06) >Right upper paratracheal opacity is of unclear etiology. CT Chest No Contrast was ordered tonight as well as RVP,   Pt had a right posterior splint on her leg, and right splint on her wrist placed by the ED on Friday. Pt was seen By Podiatry tonight , needs pain control, PT consult, NWB ,    Pt being evaluated by Hand surgery and Podiatry.    Labs: Na 140, K+ 4.5, BUN 13, Creatinine 0.55, Glucose 109, LFT Normal, WBC 6.47, Hgb 14.1, Platelet 284 ,     Vitals: Tem 98.1, HR 84, /74, RR 18, 95% RA, (29 Oct 2018 22:33)    Recommend:    - Pt with (+) sore throat.  Thus far RVP (-).  EBV serologies indicative of prior exposure.  Infectious mononucleosis screen neg.  HIV neg.  CT chest without mediastinal mass or acute pathology.  Complete course of Augmentin x 10 days (10/29 through 11/8) for tonsillitis.    - CMV serologies (show prior exposure).      *Pt POD #2 of R calcaneal ORIF, appreciate podiatry f/u.  Pt also seen by Orthopedics for with 5th metacarpal shaft fracture, no reduction needed, s/p splint.    * Monitor for new fevers, leukocytosis.  If spikes temp >100.4, send ua, urine cx, blood cx, check chest xray.    Will follow,    Spring Mcnamara  [phone]

## 2018-11-03 VITALS
OXYGEN SATURATION: 99 % | HEART RATE: 75 BPM | DIASTOLIC BLOOD PRESSURE: 85 MMHG | RESPIRATION RATE: 18 BRPM | SYSTOLIC BLOOD PRESSURE: 137 MMHG | TEMPERATURE: 98 F

## 2018-11-03 LAB
BUN SERPL-MCNC: 9 MG/DL — SIGNIFICANT CHANGE UP (ref 7–23)
CALCIUM SERPL-MCNC: 8.9 MG/DL — SIGNIFICANT CHANGE UP (ref 8.4–10.5)
CHLORIDE SERPL-SCNC: 108 MMOL/L — HIGH (ref 98–107)
CO2 SERPL-SCNC: 22 MMOL/L — SIGNIFICANT CHANGE UP (ref 22–31)
CREAT SERPL-MCNC: 0.57 MG/DL — SIGNIFICANT CHANGE UP (ref 0.5–1.3)
GLUCOSE SERPL-MCNC: 89 MG/DL — SIGNIFICANT CHANGE UP (ref 70–99)
HCT VFR BLD CALC: 39.4 % — SIGNIFICANT CHANGE UP (ref 34.5–45)
HGB BLD-MCNC: 13 G/DL — SIGNIFICANT CHANGE UP (ref 11.5–15.5)
MCHC RBC-ENTMCNC: 30.4 PG — SIGNIFICANT CHANGE UP (ref 27–34)
MCHC RBC-ENTMCNC: 33 % — SIGNIFICANT CHANGE UP (ref 32–36)
MCV RBC AUTO: 92.3 FL — SIGNIFICANT CHANGE UP (ref 80–100)
NRBC # FLD: 0 — SIGNIFICANT CHANGE UP
PLATELET # BLD AUTO: 463 K/UL — HIGH (ref 150–400)
PMV BLD: 10 FL — SIGNIFICANT CHANGE UP (ref 7–13)
POTASSIUM SERPL-MCNC: 3.9 MMOL/L — SIGNIFICANT CHANGE UP (ref 3.5–5.3)
POTASSIUM SERPL-SCNC: 3.9 MMOL/L — SIGNIFICANT CHANGE UP (ref 3.5–5.3)
RBC # BLD: 4.27 M/UL — SIGNIFICANT CHANGE UP (ref 3.8–5.2)
RBC # FLD: 12.8 % — SIGNIFICANT CHANGE UP (ref 10.3–14.5)
SODIUM SERPL-SCNC: 141 MMOL/L — SIGNIFICANT CHANGE UP (ref 135–145)
WBC # BLD: 7.72 K/UL — SIGNIFICANT CHANGE UP (ref 3.8–10.5)
WBC # FLD AUTO: 7.72 K/UL — SIGNIFICANT CHANGE UP (ref 3.8–10.5)

## 2018-11-03 RX ORDER — RALOXIFENE HYDROCHLORIDE 60 MG/1
1 TABLET, COATED ORAL
Qty: 0 | Refills: 0 | COMMUNITY

## 2018-11-03 RX ORDER — ASCORBIC ACID 60 MG
1 TABLET,CHEWABLE ORAL
Qty: 0 | Refills: 0 | COMMUNITY
Start: 2018-11-03

## 2018-11-03 RX ORDER — ENOXAPARIN SODIUM 100 MG/ML
40 INJECTION SUBCUTANEOUS
Qty: 0 | Refills: 0 | COMMUNITY

## 2018-11-03 RX ORDER — LACTOBACILLUS ACIDOPHILUS 100MM CELL
0 CAPSULE ORAL
Qty: 0 | Refills: 0 | COMMUNITY
Start: 2018-11-03

## 2018-11-03 RX ADMIN — ENOXAPARIN SODIUM 40 MILLIGRAM(S): 100 INJECTION SUBCUTANEOUS at 11:27

## 2018-11-03 RX ADMIN — Medication 500 MILLIGRAM(S): at 11:28

## 2018-11-03 RX ADMIN — Medication 1 TABLET(S): at 05:57

## 2018-11-03 RX ADMIN — BENZOCAINE AND MENTHOL 1 LOZENGE: 5; 1 LIQUID ORAL at 11:29

## 2018-11-03 RX ADMIN — Medication 1 TABLET(S): at 05:56

## 2018-11-03 NOTE — PROGRESS NOTE ADULT - PROVIDER SPECIALTY LIST ADULT
Anesthesia
Cardiology
Hospitalist
Infectious Disease
Podiatry
Hospitalist
Pulmonology

## 2018-11-03 NOTE — PROGRESS NOTE ADULT - SUBJECTIVE AND OBJECTIVE BOX
Patient is a 68y old  Female who presents with a chief complaint of S/P Mechanical fall 2 days ago, + Rt 5th Metacarpal FX, Rt Calcaneal FX , + sore throat, (30 Oct 2018 12:51)      INTERVAL HPI/OVERNIGHT EVENTS:  T(C): 36.9 (10-30-18 @ 17:14), Max: 37.2 (10-30-18 @ 10:45)  HR: 88 (10-30-18 @ 17:14) (66 - 92)  BP: 127/87 (10-30-18 @ 17:14) (122/85 - 153/99)  RR: 16 (10-30-18 @ 17:14) (16 - 18)  SpO2: 96% (10-30-18 @ 17:14) (95% - 98%)  Wt(kg): --  I&O's Summary      LABS:                        14.7   6.16  )-----------( 337      ( 30 Oct 2018 06:50 )             45.5     10-30    143  |  101  |  12  ----------------------------<  90  4.6   |  26  |  0.65    Ca    9.6      30 Oct 2018 06:50  Phos  3.6     10-30  Mg     2.6     10-30    TPro  8.1  /  Alb  4.0  /  TBili  0.6  /  DBili  x   /  AST  29  /  ALT  27  /  AlkPhos  78  10-30    PT/INR - ( 30 Oct 2018 06:50 )   PT: 10.5 SEC;   INR: 0.91          PTT - ( 30 Oct 2018 06:50 )  PTT:39.6 SEC  Urinalysis Basic - ( 30 Oct 2018 06:50 )    Color: YELLOW / Appearance: CLEAR / S.021 / pH: 6.0  Gluc: NEGATIVE / Ketone: NEGATIVE  / Bili: NEGATIVE / Urobili: NORMAL   Blood: NEGATIVE / Protein: 10 / Nitrite: NEGATIVE   Leuk Esterase: NEGATIVE / RBC: x / WBC x   Sq Epi: x / Non Sq Epi: x / Bacteria: x      CAPILLARY BLOOD GLUCOSE            Urinalysis Basic - ( 30 Oct 2018 06:50 )    Color: YELLOW / Appearance: CLEAR / S.021 / pH: 6.0  Gluc: NEGATIVE / Ketone: NEGATIVE  / Bili: NEGATIVE / Urobili: NORMAL   Blood: NEGATIVE / Protein: 10 / Nitrite: NEGATIVE   Leuk Esterase: NEGATIVE / RBC: x / WBC x   Sq Epi: x / Non Sq Epi: x / Bacteria: x        MEDICATIONS  (STANDING):  amoxicillin  875 milliGRAM(s)/clavulanate 1 Tablet(s) Oral two times a day  ascorbic acid 500 milliGRAM(s) Oral daily  benzocaine 15 mG/menthol 3.6 mG Lozenge 1 Lozenge Oral four times a day  calcium carbonate 1250 mG  + Vitamin D (OsCal 500 + D) 1 Tablet(s) Oral two times a day  enoxaparin Injectable 40 milliGRAM(s) SubCutaneous daily  lactobacillus acidophilus 1 Tablet(s) Oral every 12 hours  sodium chloride 0.9%. 1000 milliLiter(s) (75 mL/Hr) IV Continuous <Continuous>    MEDICATIONS  (PRN):  acetaminophen   Tablet .. 650 milliGRAM(s) Oral every 8 hours PRN Moderate Pain (4 - 6), Severe Pain (7 - 10)          PHYSICAL EXAM:  GENERAL: NAD, well-groomed, well-developed  HEAD:  Atraumatic, Normocephalic  CHEST/LUNG: Clear to percussion bilaterally; No rales, rhonchi, wheezing, or rubs  HEART: Regular rate and rhythm; No murmurs, rubs, or gallops  ABDOMEN: Soft, Nontender, Nondistended; Bowel sounds present  EXTREMITIES:  R leg and arm dressing  LYMPH: No lymphadenopathy noted  SKIN: No rashes or lesions    Care Discussed with Consultants/Other Providers [ ] YES  [ ] NO
ANESTHESIA POSTOP CHECK    68y Female POSTOP DAY 1 S/P     Vital Signs Last 24 Hrs  T(C): 37.2 (01 Nov 2018 05:56), Max: 37.2 (31 Oct 2018 22:00)  T(F): 99 (01 Nov 2018 05:56), Max: 99 (01 Nov 2018 05:56)  HR: 94 (01 Nov 2018 00:00) (80 - 105)  BP: 149/93 (01 Nov 2018 05:56) (104/76 - 160/99)  BP(mean): 93 (01 Nov 2018 00:00) (83 - 97)  RR: 16 (01 Nov 2018 05:56) (12 - 20)  SpO2: 96% (01 Nov 2018 05:56) (95% - 100%)  I&O's Summary    31 Oct 2018 07:01  -  01 Nov 2018 07:00  --------------------------------------------------------  IN: 1420 mL / OUT: 925 mL / NET: 495 mL        [ x] NO APPARENT ANESTHESIA COMPLICATIONS      Comments:
Dilip Enciso MD  Interventional Cardiology  [place] Office : [address] [place] [address]  Tel:   [place] Office : [address] [facility] [place]. [address]  Tel: [phone]  Cell : [phone]    Subjective : Pt lying in bed comfortable, not in distress, denies any chest pain or SOB  	  MEDICATIONS:  enoxaparin Injectable 40 milliGRAM(s) SubCutaneous daily    amoxicillin  875 milliGRAM(s)/clavulanate 1 Tablet(s) Oral two times a day      acetaminophen   Tablet .. 650 milliGRAM(s) Oral every 6 hours PRN  morphine  - Injectable 2 milliGRAM(s) IV Push every 6 hours PRN  oxyCODONE    5 mG/acetaminophen 325 mG 2 Tablet(s) Oral every 6 hours PRN  oxyCODONE    5 mG/acetaminophen 325 mG 1 Tablet(s) Oral every 6 hours PRN        ascorbic acid 500 milliGRAM(s) Oral daily  benzocaine 15 mG/menthol 3.6 mG Lozenge 1 Lozenge Oral four times a day  calcium carbonate 1250 mG  + Vitamin D (OsCal 500 + D) 1 Tablet(s) Oral two times a day  influenza   Vaccine 0.5 milliLiter(s) IntraMuscular once  sodium chloride 0.9%. 1000 milliLiter(s) IV Continuous <Continuous>      PHYSICAL EXAM:  T(C): 36.8 (11-03-18 @ 05:54), Max: 36.8 (11-03-18 @ 05:54)  HR: 75 (11-03-18 @ 05:54) (75 - 86)  BP: 137/85 (11-03-18 @ 05:54) (133/75 - 137/85)  RR: 18 (11-03-18 @ 05:54) (18 - 18)  SpO2: 99% (11-03-18 @ 05:54) (95% - 99%)  Wt(kg): --  I&O's Summary    02 Nov 2018 07:01  -  03 Nov 2018 07:00  --------------------------------------------------------  IN: 300 mL / OUT: 1000 mL / NET: -700 mL          Appearance: Normal	  HEENT:   Normal oral mucosa, PERRL, EOMI	  Cardiovascular: Normal S1 S2, No JVD, No murmurs, No edema  Respiratory: Lungs clear to auscultation	  Gastrointestinal:  Soft, Non-tender, + BS	  Extremities: Rt upper and Lower extremity intact                                     13.0   7.72  )-----------( 463      ( 03 Nov 2018 06:10 )             39.4     11-03    141  |  108<H>  |  9   ----------------------------<  89  3.9   |  22  |  0.57    Ca    8.9      03 Nov 2018 06:10      proBNP:   Lipid Profile:   HgA1c:   TSH:
Dilip Enciso MD  Interventional Cardiology  [place] Office : [address] [place] [address]  Tel:   [place] Office : [address] [facility] [place] [address]  Tel: [phone]  Cell : [phone]    Subjective : Pt lying in bed comfortable, not in distress, denies any chest pain or SOB  	  MEDICATIONS:  enoxaparin Injectable 40 milliGRAM(s) SubCutaneous daily    amoxicillin  875 milliGRAM(s)/clavulanate 1 Tablet(s) Oral two times a day      acetaminophen   Tablet .. 650 milliGRAM(s) Oral every 8 hours PRN        ascorbic acid 500 milliGRAM(s) Oral daily  benzocaine 15 mG/menthol 3.6 mG Lozenge 1 Lozenge Oral four times a day  calcium carbonate 1250 mG  + Vitamin D (OsCal 500 + D) 1 Tablet(s) Oral two times a day  influenza   Vaccine 0.5 milliLiter(s) IntraMuscular once  sodium chloride 0.9%. 1000 milliLiter(s) IV Continuous <Continuous>      PHYSICAL EXAM:  T(C): 37.1 (10-31-18 @ 11:02), Max: 37.1 (10-31-18 @ 11:02)  HR: 74 (10-31-18 @ 11:02) (66 - 88)  BP: 157/89 (10-31-18 @ 11:02) (122/85 - 157/89)  RR: 18 (10-31-18 @ 11:02) (16 - 18)  SpO2: 98% (10-31-18 @ 11:02) (96% - 98%)  Wt(kg): --  I&O's Summary        Appearance: Normal	  HEENT:   Normal oral mucosa, PERRL, EOMI	  Cardiovascular: Normal S1 S2, No JVD, No murmurs, No edema  Respiratory: Lungs clear to auscultation	  Gastrointestinal:  Soft, Non-tender, + BS	  Extremities: no edema                                13.1   5.65  )-----------( 326      ( 31 Oct 2018 06:18 )             40.4     10-31    144  |  110<H>  |  9   ----------------------------<  90  3.3<L>   |  22  |  0.48<L>    Ca    7.9<L>      31 Oct 2018 09:40  Phos  3.6     10-30  Mg     2.6     10-30    TPro  8.1  /  Alb  4.0  /  TBili  0.6  /  DBili  x   /  AST  29  /  ALT  27  /  AlkPhos  78  10-30    proBNP:   Lipid Profile:   HgA1c:   TSH:
Dilip Enciso MD  Interventional Cardiology  [place] Office : [address] [place] [address] [facility] [address]  Tel:   [place] Office : [address] [facility] [place] [address]  Tel: [phone]  Cell : [phone]    Subjective : Pt lying in bed comfortable, not in distress, denies any chest pain or SOB  	  MEDICATIONS:  enoxaparin Injectable 40 milliGRAM(s) SubCutaneous daily    amoxicillin  875 milliGRAM(s)/clavulanate 1 Tablet(s) Oral two times a day      acetaminophen   Tablet .. 650 milliGRAM(s) Oral every 6 hours PRN  morphine  - Injectable 2 milliGRAM(s) IV Push every 6 hours PRN  oxyCODONE    5 mG/acetaminophen 325 mG 2 Tablet(s) Oral every 6 hours PRN        ascorbic acid 500 milliGRAM(s) Oral daily  benzocaine 15 mG/menthol 3.6 mG Lozenge 1 Lozenge Oral four times a day  calcium carbonate 1250 mG  + Vitamin D (OsCal 500 + D) 1 Tablet(s) Oral two times a day  influenza   Vaccine 0.5 milliLiter(s) IntraMuscular once  sodium chloride 0.9%. 1000 milliLiter(s) IV Continuous <Continuous>      PHYSICAL EXAM:  T(C): 37.9 (11-01-18 @ 14:25), Max: 37.9 (11-01-18 @ 14:25)  HR: 107 (11-01-18 @ 14:25) (80 - 107)  BP: 120/74 (11-01-18 @ 14:25) (104/76 - 149/93)  RR: 18 (11-01-18 @ 14:25) (12 - 20)  SpO2: 95% (11-01-18 @ 14:25) (95% - 100%)  Wt(kg): --  I&O's Summary    31 Oct 2018 07:01  -  01 Nov 2018 07:00  --------------------------------------------------------  IN: 1420 mL / OUT: 925 mL / NET: 495 mL          Appearance: Normal	  HEENT:   Normal oral mucosa, PERRL, EOMI	  Cardiovascular: Normal S1 S2, No JVD, No murmurs, No edema  Respiratory: Lungs clear to auscultation	  Gastrointestinal:  Soft, Non-tender, + BS	  Extremities: No clubbing, cyanosis or edema                                    13.5   11.48 )-----------( 374      ( 01 Nov 2018 05:42 )             41.4     11-01    139  |  104  |  9   ----------------------------<  88  3.8   |  22  |  0.58    Ca    8.1<L>      01 Nov 2018 05:45      proBNP:   Lipid Profile:   HgA1c:   TSH:
Dilip Enciso MD  Interventional Cardiology  [place] Office : [address] [place] [address]  Tel:   [place] Office : [address] [facility] [place]. [address]  Tel: [phone]  Cell : [phone]    Subjective : Pt lying in bed comfortable, not in distress, denies any chest pain or SOB  	  MEDICATIONS:  enoxaparin Injectable 40 milliGRAM(s) SubCutaneous daily  amoxicillin  875 milliGRAM(s)/clavulanate 1 Tablet(s) Oral two times a day  acetaminophen   Tablet .. 650 milliGRAM(s) Oral every 6 hours PRN  morphine  - Injectable 2 milliGRAM(s) IV Push every 6 hours PRN  oxyCODONE    5 mG/acetaminophen 325 mG 2 Tablet(s) Oral every 6 hours PRN  oxyCODONE    5 mG/acetaminophen 325 mG 1 Tablet(s) Oral every 6 hours PRN  ascorbic acid 500 milliGRAM(s) Oral daily  benzocaine 15 mG/menthol 3.6 mG Lozenge 1 Lozenge Oral four times a day  calcium carbonate 1250 mG  + Vitamin D (OsCal 500 + D) 1 Tablet(s) Oral two times a day  influenza   Vaccine 0.5 milliLiter(s) IntraMuscular once  sodium chloride 0.9%. 1000 milliLiter(s) IV Continuous <Continuous>      PHYSICAL EXAM:  T(C): 36.2 (11-02-18 @ 20:24), Max: 36.9 (11-02-18 @ 14:11)  HR: 86 (11-02-18 @ 20:24) (76 - 93)  BP: 133/75 (11-02-18 @ 20:24) (126/76 - 146/82)  RR: 18 (11-02-18 @ 20:24) (18 - 18)  SpO2: 95% (11-02-18 @ 20:24) (95% - 97%)  Wt(kg): --  I&O's Summary        Appearance: Normal	  HEENT:   Normal oral mucosa, PERRL, EOMI	  Cardiovascular: Normal S1 S2, No JVD, No murmurs, No edema  Respiratory: Lungs clear to auscultation	  Gastrointestinal:  Soft, Non-tender, + BS	  Extremities: No clubbing, cyanosis or edema, dressing intact                                     13.0   9.83  )-----------( 394      ( 02 Nov 2018 06:00 )             39.1     11-02    140  |  103  |  9   ----------------------------<  101<H>  3.8   |  24  |  0.56    Ca    8.8      02 Nov 2018 06:00      proBNP:   Lipid Profile:   HgA1c:   TSH:
Infectious Diseases progress note:    Subjective: Feels well, ready to go home.  Sore throat resolved.  Afebrile.   at bedside.  Pain in foot improving    ROS:  CONSTITUTIONAL:  No fever, chills, rigors  CARDIOVASCULAR:  No chest pain or palpitations  RESPIRATORY:   No SOB, cough, dyspnea on exertion.  No wheezing  GASTROINTESTINAL:  No abd pain, N/V, diarrhea/constipation  EXTREMITIES:  No swelling or joint pain  GENITOURINARY:  No burning on urination, increased frequency or urgency.  No flank pain  NEUROLOGIC:  No HA, visual disturbances  SKIN: No rashes    Allergies    No Known Allergies    Intolerances        ANTIBIOTICS/RELEVANT:  antimicrobials  amoxicillin  875 milliGRAM(s)/clavulanate 1 Tablet(s) Oral two times a day    immunologic:  influenza   Vaccine 0.5 milliLiter(s) IntraMuscular once    OTHER:  acetaminophen   Tablet .. 650 milliGRAM(s) Oral every 6 hours PRN  ascorbic acid 500 milliGRAM(s) Oral daily  benzocaine 15 mG/menthol 3.6 mG Lozenge 1 Lozenge Oral four times a day  calcium carbonate 1250 mG  + Vitamin D (OsCal 500 + D) 1 Tablet(s) Oral two times a day  enoxaparin Injectable 40 milliGRAM(s) SubCutaneous daily  lactobacillus acidophilus 1 Tablet(s) Oral every 12 hours  morphine  - Injectable 2 milliGRAM(s) IV Push every 6 hours PRN  oxyCODONE    5 mG/acetaminophen 325 mG 2 Tablet(s) Oral every 6 hours PRN  oxyCODONE    5 mG/acetaminophen 325 mG 1 Tablet(s) Oral every 6 hours PRN  sodium chloride 0.9%. 1000 milliLiter(s) IV Continuous <Continuous>      Objective:  Vital Signs Last 24 Hrs  T(C): 36.8 (03 Nov 2018 05:54), Max: 36.9 (02 Nov 2018 14:11)  T(F): 98.3 (03 Nov 2018 05:54), Max: 98.5 (02 Nov 2018 14:11)  HR: 75 (03 Nov 2018 05:54) (75 - 93)  BP: 137/85 (03 Nov 2018 05:54) (126/76 - 137/85)  BP(mean): --  RR: 18 (03 Nov 2018 05:54) (18 - 18)  SpO2: 99% (03 Nov 2018 05:54) (95% - 99%)    PHYSICAL EXAM:  Constitutional:NAD  Eyes:SINDY, EOMI  Ear/Nose/Throat: no thrush, mucositis.  Moist mucous membranes	  Neck:no JVD, no lymphadenopathy, supple  Respiratory: CTA purvi  Cardiovascular: S1S2 RRR, no murmurs  Gastrointestinal:soft, nontender,  nondistended (+) BS  Extremities:no e/e/c  Skin:  rt foot dsg and rt hand splint in place.          LABS:                        13.0   7.72  )-----------( 463      ( 03 Nov 2018 06:10 )             39.4     11-03    141  |  108<H>  |  9   ----------------------------<  89  3.9   |  22  |  0.57    Ca    8.9      03 Nov 2018 06:10          MICROBIOLOGY:          RADIOLOGY & ADDITIONAL STUDIES:    < from: Xray Hand 3 Views, Right (11.02.18 @ 12:02) >  IMPRESSION:  Splint and bandaging material again seen around the extremity slightly   obscuring underlying fine osseous detail.    Redemonstrated slightly offset volarly angulated and overriding obliquely   oriented distal 5th metacarpal shaft fracture without significant change   in overall fracture configuration or alignment. No intraarticular   involvement.    No dislocations or additional fractures.    Preserved joint spaces and no joint margin erosions.    Carpal bones normally aligned.    Neutral ulnar variance.    Generalized mild osteopenia otherwise no discrete lytic or blastic   lesions.    < end of copied text >
Infectious Diseases progress note:    Subjective: NAD, resting comfortably.  Low grade temp 100.2 earlier.      ROS:  CONSTITUTIONAL:  No fever, chills, rigors  CARDIOVASCULAR:  No chest pain or palpitations  RESPIRATORY:   No SOB, cough, dyspnea on exertion.  No wheezing  GASTROINTESTINAL:  No abd pain, N/V, diarrhea/constipation  EXTREMITIES:  No swelling or joint pain  GENITOURINARY:  No burning on urination, increased frequency or urgency.  No flank pain  NEUROLOGIC:  No HA, visual disturbances  SKIN: No rashes    Allergies    No Known Allergies    Intolerances        ANTIBIOTICS/RELEVANT:  antimicrobials  amoxicillin  875 milliGRAM(s)/clavulanate 1 Tablet(s) Oral two times a day    immunologic:  influenza   Vaccine 0.5 milliLiter(s) IntraMuscular once    OTHER:  acetaminophen   Tablet .. 650 milliGRAM(s) Oral every 6 hours PRN  ascorbic acid 500 milliGRAM(s) Oral daily  benzocaine 15 mG/menthol 3.6 mG Lozenge 1 Lozenge Oral four times a day  calcium carbonate 1250 mG  + Vitamin D (OsCal 500 + D) 1 Tablet(s) Oral two times a day  enoxaparin Injectable 40 milliGRAM(s) SubCutaneous daily  lactobacillus acidophilus 1 Tablet(s) Oral every 12 hours  morphine  - Injectable 2 milliGRAM(s) IV Push every 6 hours PRN  oxyCODONE    5 mG/acetaminophen 325 mG 2 Tablet(s) Oral every 6 hours PRN  oxyCODONE    5 mG/acetaminophen 325 mG 1 Tablet(s) Oral every 6 hours PRN  sodium chloride 0.9%. 1000 milliLiter(s) IV Continuous <Continuous>      Objective:  Vital Signs Last 24 Hrs  T(C): 36.9 (02 Nov 2018 14:11), Max: 36.9 (02 Nov 2018 14:11)  T(F): 98.5 (02 Nov 2018 14:11), Max: 98.5 (02 Nov 2018 14:11)  HR: 93 (02 Nov 2018 14:11) (76 - 93)  BP: 126/76 (02 Nov 2018 14:11) (108/68 - 146/82)  BP(mean): --  RR: 18 (02 Nov 2018 14:11) (16 - 18)  SpO2: 95% (02 Nov 2018 14:11) (93% - 97%)    PHYSICAL EXAM:  Constitutional:NAD  Eyes:SINDY, EOMI  Ear/Nose/Throat: no thrush, mucositis.  Moist mucous membranes	  Neck:no JVD, no lymphadenopathy, supple  Respiratory: CTA purvi  Cardiovascular: S1S2 RRR, no murmurs  Gastrointestinal:soft, nontender,  nondistended (+) BS  Extremities: rt hand and rt foot dsg c/d/i  Skin:  no rashes, open wounds or ulcerations        LABS:                        13.0   9.83  )-----------( 394      ( 02 Nov 2018 06:00 )             39.1     11-02    140  |  103  |  9   ----------------------------<  101<H>  3.8   |  24  |  0.56    Ca    8.8      02 Nov 2018 06:00          MICROBIOLOGY:            RADIOLOGY & ADDITIONAL STUDIES:    < from: Xray Hand 3 Views, Right (11.02.18 @ 12:02) >  IMPRESSION:  Splint and bandaging material again seen around the extremity slightly   obscuring underlying fine osseous detail.    Redemonstrated slightly offset volarly angulated and overriding obliquely   oriented distal 5th metacarpal shaft fracture without significant change   in overall fracture configuration or alignment. No intraarticular   involvement.    No dislocations or additional fractures.    Preserved joint spaces and no joint margin erosions.    Carpal bones normally aligned.    Neutral ulnar variance.    Generalized mild osteopenia otherwise no discrete lytic or blastic   lesions.    < end of copied text >        < from: Xray Hand 3 Views, Right (11.01.18 @ 19:58) >  IMPRESSION:  Interval application of splint and bandaging material around the   extremity slightly obscuring underlying fine osseous detail.    Redemonstrated slightly offset and overriding obliquely oriented distal   5th metacarpal shaft fracture without significant change in overall   fracture configuration or alignment. No intraarticular involvement.     No dislocations or additional fractures.     Preserved joint spaces and no joint margin erosions.    Carpal bones normally aligned.    Neutral ulnar variance.    Generalized mild osteopenia otherwise no discrete lytic or blastic   lesions.    < end of copied text >      < from: Xray Hand 3 Views, Right (11.01.18 @ 19:58) >  IMPRESSION:  Interval application of splint and bandaging material around the   extremity slightly obscuring underlying fine osseous detail.    Redemonstrated slightly offset and overriding obliquely oriented distal   5th metacarpal shaft fracture without significant change in overall   fracture configuration or alignment. No intraarticular involvement.     No dislocations or additional fractures.     Preserved joint spaces and no joint margin erosions.    Carpal bones normally aligned.    Neutral ulnar variance.    Generalized mild osteopenia otherwise no discrete lytic or blastic   lesions.    < end of copied text >
Infectious Diseases progress note:    Subjective: s/p ORIF of calcaneal fracture.  No acute o/n events.  Afebrile.   Mild elevation in WBC.  c/o dry throat.      ROS:  CONSTITUTIONAL:  No fever, chills, rigors  CARDIOVASCULAR:  No chest pain or palpitations  RESPIRATORY:   No SOB, cough, dyspnea on exertion.  No wheezing  GASTROINTESTINAL:  No abd pain, N/V, diarrhea/constipation  EXTREMITIES:  No swelling or joint pain  GENITOURINARY:  No burning on urination, increased frequency or urgency.  No flank pain  NEUROLOGIC:  No HA, visual disturbances  SKIN: No rashes    Allergies    No Known Allergies    Intolerances        ANTIBIOTICS/RELEVANT:  antimicrobials  amoxicillin  875 milliGRAM(s)/clavulanate 1 Tablet(s) Oral two times a day    immunologic:  influenza   Vaccine 0.5 milliLiter(s) IntraMuscular once    OTHER:  acetaminophen   Tablet .. 650 milliGRAM(s) Oral every 6 hours PRN  ascorbic acid 500 milliGRAM(s) Oral daily  benzocaine 15 mG/menthol 3.6 mG Lozenge 1 Lozenge Oral four times a day  calcium carbonate 1250 mG  + Vitamin D (OsCal 500 + D) 1 Tablet(s) Oral two times a day  enoxaparin Injectable 40 milliGRAM(s) SubCutaneous daily  lactobacillus acidophilus 1 Tablet(s) Oral every 12 hours  morphine  - Injectable 2 milliGRAM(s) IV Push every 6 hours PRN  oxyCODONE    5 mG/acetaminophen 325 mG 2 Tablet(s) Oral every 6 hours PRN  sodium chloride 0.9%. 1000 milliLiter(s) IV Continuous <Continuous>      Objective:  Vital Signs Last 24 Hrs  T(C): 37.2 (01 Nov 2018 05:56), Max: 37.2 (31 Oct 2018 22:00)  T(F): 99 (01 Nov 2018 05:56), Max: 99 (01 Nov 2018 05:56)  HR: 94 (01 Nov 2018 00:00) (80 - 105)  BP: 149/93 (01 Nov 2018 05:56) (104/76 - 160/99)  BP(mean): 93 (01 Nov 2018 00:00) (83 - 97)  RR: 16 (01 Nov 2018 05:56) (12 - 20)  SpO2: 96% (01 Nov 2018 05:56) (95% - 100%)    PHYSICAL EXAM:  Constitutional:NAD  Eyes:SINDY, EOMI  Ear/Nose/Throat: no thrush, mucositis.  Moist mucous membranes	  Neck:no JVD, no lymphadenopathy, supple  Respiratory: CTA purvi  Cardiovascular: S1S2 RRR, no murmurs  Gastrointestinal:soft, nontender,  nondistended (+) BS  Extremities:no e/e/c  Skin:  no rashes, open wounds or ulcerations        LABS:                        13.5   11.48 )-----------( 374      ( 01 Nov 2018 05:42 )             41.4     11-01    139  |  104  |  9   ----------------------------<  88  3.8   |  22  |  0.58    Ca    8.1<L>      01 Nov 2018 05:45      PT/INR - ( 31 Oct 2018 06:18 )   PT: 11.3 SEC;   INR: 1.02          PTT - ( 31 Oct 2018 06:18 )  PTT:32.8 SEC                        MICROBIOLOGY:          RADIOLOGY & ADDITIONAL STUDIES:    < from: Xray Foot AP + Lateral + Oblique, Bilat (10.31.18 @ 11:24) >    IMPRESSION:  Splint material overlies plantar and posterior surfaces of the right   lower extremity.    Impacted and comminuted intra-articular right calcaneal fracture with   flattened Boehler's angle. No dislocations or additional fractures.    Tarsometatarsal alignment maintained without evidence for a Lisfranc   injury.     Bilateral hallux valgus deformities with associated small overlying   bunions. Preserved remaining joint spaces and no joint margin erosions.    Bilateralaccessory navicular ossicles.    No discrete lytic or blastic lesions.    < end of copied text >        < from: CT Chest No Cont (10.30.18 @ 11:49) >  IMPRESSION:    When compared with recent chest x-ray of October 27, 2018:    There is no mediastinal mass identified. The apparent opacity seen on   recent chest x-ray was due to superimposition of normal structures. No   further follow-up is required for this finding.    < end of copied text >
Patient is a 68y old  Female who presents with a chief complaint of S/P Mechanical fall 2 days ago, + Rt 5th Metacarpal FX, Rt Calcaneal FX , + sore throat, (01 Nov 2018 12:48)      INTERVAL HPI/OVERNIGHT EVENTS:  T(C): 37.9 (11-01-18 @ 14:25), Max: 37.9 (11-01-18 @ 14:25)  HR: 107 (11-01-18 @ 14:25) (86 - 107)  BP: 120/74 (11-01-18 @ 14:25) (120/74 - 149/93)  RR: 18 (11-01-18 @ 14:25) (12 - 20)  SpO2: 95% (11-01-18 @ 14:25) (95% - 99%)  Wt(kg): --  I&O's Summary    31 Oct 2018 07:01  -  01 Nov 2018 07:00  --------------------------------------------------------  IN: 1420 mL / OUT: 925 mL / NET: 495 mL        LABS:                        13.5   11.48 )-----------( 374      ( 01 Nov 2018 05:42 )             41.4     11-01    139  |  104  |  9   ----------------------------<  88  3.8   |  22  |  0.58    Ca    8.1<L>      01 Nov 2018 05:45      PT/INR - ( 31 Oct 2018 06:18 )   PT: 11.3 SEC;   INR: 1.02          PTT - ( 31 Oct 2018 06:18 )  PTT:32.8 SEC    CAPILLARY BLOOD GLUCOSE                MEDICATIONS  (STANDING):  amoxicillin  875 milliGRAM(s)/clavulanate 1 Tablet(s) Oral two times a day  ascorbic acid 500 milliGRAM(s) Oral daily  benzocaine 15 mG/menthol 3.6 mG Lozenge 1 Lozenge Oral four times a day  calcium carbonate 1250 mG  + Vitamin D (OsCal 500 + D) 1 Tablet(s) Oral two times a day  enoxaparin Injectable 40 milliGRAM(s) SubCutaneous daily  influenza   Vaccine 0.5 milliLiter(s) IntraMuscular once  lactobacillus acidophilus 1 Tablet(s) Oral every 12 hours  sodium chloride 0.9%. 1000 milliLiter(s) (75 mL/Hr) IV Continuous <Continuous>    MEDICATIONS  (PRN):  acetaminophen   Tablet .. 650 milliGRAM(s) Oral every 6 hours PRN Mild Pain (1 - 3)  morphine  - Injectable 2 milliGRAM(s) IV Push every 6 hours PRN Severe Pain (7 - 10)  oxyCODONE    5 mG/acetaminophen 325 mG 2 Tablet(s) Oral every 6 hours PRN Moderate Pain (4 - 6)  oxyCODONE    5 mG/acetaminophen 325 mG 1 Tablet(s) Oral every 6 hours PRN Mild Pain (1 - 3)          PHYSICAL EXAM:  GENERAL: NAD, well-groomed, well-developed  HEAD:  Atraumatic, Normocephalic  CHEST/LUNG: Clear to percussion bilaterally; No rales, rhonchi, wheezing, or rubs  HEART: Regular rate and rhythm; No murmurs, rubs, or gallops  ABDOMEN: Soft, Nontender, Nondistended; Bowel sounds present  EXTREMITIES:  2+ Peripheral Pulses, No clubbing, cyanosis, or edema  LYMPH: No lymphadenopathy noted  SKIN: No rashes or lesions    Care Discussed with Consultants/Other Providers [ ] YES  [ ] NO
Patient is a 68y old  Female who presents with a chief complaint of S/P Mechanical fall 2 days ago, + Rt 5th Metacarpal FX, Rt Calcaneal FX , + sore throat, (01 Nov 2018 19:23)       INTERVAL HPI/OVERNIGHT EVENTS:  Patient seen and evaluated at bedside.  Pt is resting comfortable in NAD. Denies N/V/F/C.  Pain well controlled at this time. Dressing clean, dry and intact.    Allergies    No Known Allergies    Intolerances        Vital Signs Last 24 Hrs  T(C): 36.3 (02 Nov 2018 05:19), Max: 37.9 (01 Nov 2018 14:25)  T(F): 97.3 (02 Nov 2018 05:19), Max: 100.2 (01 Nov 2018 14:25)  HR: 76 (02 Nov 2018 05:19) (76 - 107)  BP: 146/82 (02 Nov 2018 05:19) (108/68 - 146/82)  BP(mean): --  RR: 18 (02 Nov 2018 05:19) (16 - 18)  SpO2: 97% (02 Nov 2018 05:19) (93% - 97%)    LABS:                        13.0   9.83  )-----------( 394      ( 02 Nov 2018 06:00 )             39.1     11-02    140  |  103  |  9   ----------------------------<  101<H>  3.8   |  24  |  0.56    Ca    8.8      02 Nov 2018 06:00          CAPILLARY BLOOD GLUCOSE          Lower Extremity Physical Exam:  s/p R calc ORIF, incision on lateral foot w/ stab incisions medial and posterior, sutures well coapted, no active bleeding, mild swelling to dorsal foot    RADIOLOGY & ADDITIONAL TESTS:
Patient is a 68y old  Female who presents with a chief complaint of S/P Mechanical fall 2 days ago, + Rt 5th Metacarpal FX, Rt Calcaneal FX , + sore throat, (02 Nov 2018 10:11)      INTERVAL HPI/OVERNIGHT EVENTS:  T(C): 36.9 (11-02-18 @ 14:11), Max: 36.9 (11-02-18 @ 14:11)  HR: 93 (11-02-18 @ 14:11) (76 - 93)  BP: 126/76 (11-02-18 @ 14:11) (108/68 - 146/82)  RR: 18 (11-02-18 @ 14:11) (16 - 18)  SpO2: 95% (11-02-18 @ 14:11) (93% - 97%)  Wt(kg): --  I&O's Summary      LABS:                        13.0   9.83  )-----------( 394      ( 02 Nov 2018 06:00 )             39.1     11-02    140  |  103  |  9   ----------------------------<  101<H>  3.8   |  24  |  0.56    Ca    8.8      02 Nov 2018 06:00          CAPILLARY BLOOD GLUCOSE                MEDICATIONS  (STANDING):  amoxicillin  875 milliGRAM(s)/clavulanate 1 Tablet(s) Oral two times a day  ascorbic acid 500 milliGRAM(s) Oral daily  benzocaine 15 mG/menthol 3.6 mG Lozenge 1 Lozenge Oral four times a day  calcium carbonate 1250 mG  + Vitamin D (OsCal 500 + D) 1 Tablet(s) Oral two times a day  enoxaparin Injectable 40 milliGRAM(s) SubCutaneous daily  influenza   Vaccine 0.5 milliLiter(s) IntraMuscular once  lactobacillus acidophilus 1 Tablet(s) Oral every 12 hours  sodium chloride 0.9%. 1000 milliLiter(s) (75 mL/Hr) IV Continuous <Continuous>    MEDICATIONS  (PRN):  acetaminophen   Tablet .. 650 milliGRAM(s) Oral every 6 hours PRN Mild Pain (1 - 3)  morphine  - Injectable 2 milliGRAM(s) IV Push every 6 hours PRN Severe Pain (7 - 10)  oxyCODONE    5 mG/acetaminophen 325 mG 2 Tablet(s) Oral every 6 hours PRN Moderate Pain (4 - 6)  oxyCODONE    5 mG/acetaminophen 325 mG 1 Tablet(s) Oral every 6 hours PRN Mild Pain (1 - 3)          PHYSICAL EXAM:  GENERAL: NAD, well-groomed, well-developed  HEAD:  Atraumatic, Normocephalic  CHEST/LUNG: Clear to percussion bilaterally; No rales, rhonchi, wheezing, or rubs  HEART: Regular rate and rhythm; No murmurs, rubs, or gallops  ABDOMEN: Soft, Nontender, Nondistended; Bowel sounds present  EXTREMITIES:  2+ Peripheral Pulses, No clubbing, cyanosis, or edema  LYMPH: No lymphadenopathy noted  SKIN: No rashes or lesions    Care Discussed with Consultants/Other Providers [ ] YES  [ ] NO
Patient is a 68y old  Female who presents with a chief complaint of S/P Mechanical fall 2 days ago, + Rt 5th Metacarpal FX, Rt Calcaneal FX , + sore throat, (29 Oct 2018 22:33)       INTERVAL HPI/OVERNIGHT EVENTS:  Patient seen and evaluated at bedside.  Pt is resting comfortable in NAD. Denies N/V/F/C.  Pain well controlled at this time.    Allergies    No Known Allergies    Intolerances        Vital Signs Last 24 Hrs  T(C): 36.5 (30 Oct 2018 06:38), Max: 37.1 (30 Oct 2018 03:41)  T(F): 97.7 (30 Oct 2018 06:38), Max: 98.8 (30 Oct 2018 03:41)  HR: 92 (30 Oct 2018 06:38) (80 - 92)  BP: 144/97 (30 Oct 2018 06:38) (129/74 - 153/99)  BP(mean): --  RR: 16 (30 Oct 2018 06:38) (16 - 19)  SpO2: 98% (30 Oct 2018 06:38) (95% - 100%)    LABS:                        14.7   6.16  )-----------( 337      ( 30 Oct 2018 06:50 )             45.5     10-    140  |  102  |  13  ----------------------------<  109<H>  4.5   |  24  |  0.55    Ca    9.2      29 Oct 2018 17:20    TPro  7.4  /  Alb  3.7  /  TBili  0.4  /  DBili  x   /  AST  31  /  ALT  22  /  AlkPhos  69  10-29    PT/INR - ( 30 Oct 2018 06:50 )   PT: 10.5 SEC;   INR: 0.91          PTT - ( 30 Oct 2018 06:50 )  PTT:39.6 SEC  Urinalysis Basic - ( 30 Oct 2018 06:50 )    Color: YELLOW / Appearance: CLEAR / S.021 / pH: 6.0  Gluc: NEGATIVE / Ketone: NEGATIVE  / Bili: NEGATIVE / Urobili: NORMAL   Blood: NEGATIVE / Protein: 10 / Nitrite: NEGATIVE   Leuk Esterase: NEGATIVE / RBC: x / WBC x   Sq Epi: x / Non Sq Epi: x / Bacteria: x      CAPILLARY BLOOD GLUCOSE          Lower Extremity Physical Exam:    Vascular: DP/PT 2/4, B/L, CFT <3 seconds B/L, Temperature gradient within normal limits, B/L. Non pitting edema +1 to right lateral foot. Skin lines present on exam.   Neuro: Epicritic sensation intact to b/l feet  Musculoskeletal/Ortho: Pain on palpation of lateral foot at the level of the lateral calcaneus, pain w/ motion of STJ, and ankle joint. Muscle strength guarded but in tact  Skin: Skin envelope intact, no openings or abrasions in skin, no blistering    RADIOLOGY & ADDITIONAL TESTS:  < from: CT Foot No Cont, Right (10.29.18 @ 14:52) >  EXAM:  CT FOOT ONLY RT      EXAM:  CT 3D RECONSTRUCT WO CARLOS        PROCEDURE DATE:  Oct 29 2018         INTERPRETATION:  CT FOOT ONLY RT, CT 3D RECONSTRUCT WO CARLOS dated   10/29/2018 2:52 PM     INDICATION: Heel pain after fall.    COMPARISON: Foot radiographs dated 10/27/2018    TECHNIQUE: CT imaging of the ankle and foot was performed. The data was   reformatted in the axial, coronal, and sagittal planes. Additionally, 3-D   reformatted imaging was created at a separate workstation.    FINDINGS:    OSSEOUS STRUCTURES: There is a comminuted and mildly impacted fracture of   the calcaneus. There is a fracture involving the posterior subtalar joint   at the calcaneal articular surface is oriented in the sagittal plane. In   addition, there is a short axis oriented fracture involving the periphery   of the lateral portion of the posterior subtalar joint. There is spurring   of the sustentaculum keren. There is extension into the calcaneocuboid   joint without significant displacement of fracture fragments. There is   extension of the fracture into the anterior process of the calcaneus no   additional fracture is identified. There is productive change at the   first metatarsophalangeal joint.  SYNOVIUM/ JOINT FLUID: There is no joint effusion.  TENDONS: The tendons are intact. No full-thickness tendon tear or   retraction is identified. Small enthesophyte formation at the Achilles   tendon insertion.  MUSCLES: There is no intramuscular hematoma.  NEUROVASCULAR STRUCTURES:Preserved  SUBCUTANEOUS SOFT TISSUES: There is soft tissue swelling about the   posterior foot. No drainable fluid collection is seen.    3-D reformatted imaging confirms these findings.    IMPRESSION:    1.  Comminuted calcaneal fracture with intra-articular extension at the   posterior subtalar joint and calcaneal cuboid joint.  2.  Comminuted and nondisplaced fracture of the anterior process of the   calcaneus.                  SOURAV LIN M.D., ATTENDING RADIOLOGIST  This document has been electronically signed. Oct 29 2018  5:11PM    < end of copied text >    < from: Xray Foot AP + Lateral + Oblique, Right (10.27.18 @ 14:32) >  EXAM:  RAD ANKLE MIN 3 VIEWS RIGHT      EXAM:  RAD FOOT MIN 3 VIEWS RIGHT        PROCEDURE DATE:  Oct 27 2018         INTERPRETATION:  CLINICAL INDICATION:  Right ankle pain after fall.    TECHNIQUE: AP, lateral, and oblique views of the right footand ankle    COMPARISON: None    IMPRESSION:    There is subtle cortical break involving the calcaneus with associated   flattening of the foot angle representing a calcaneal fracture. CT is   recommended for complete evaluation.    No dislocation. Joint spaces are maintained. No soft tissue swelling.  No   radiopaque foreign body. There is hallux valgus deformity of the right   foot. There is a small retrocalcaneal enthesophyte. There are   degenerative changes of the right foot joints.    This represents a change from the preliminary report and was submitted   electronically on 2018 at 11:55 AM.                DAVID SRINIVASAN M.D., RADIOLOGY RESIDENT  This document has been electronically signed.  GRACIE VITALE M.D. ATTENDING RADIOLOGIST  This document has been electronically signed. Oct 28 2018 11:56AM    < end of copied text >
Patient is a 68y old  Female who presents with a chief complaint of S/P Mechanical fall 2 days ago, + Rt 5th Metacarpal FX, Rt Calcaneal FX , + sore throat, (31 Oct 2018 18:14)      INTERVAL HPI/OVERNIGHT EVENTS:  T(C): 37.2 (10-31-18 @ 22:00), Max: 37.2 (10-31-18 @ 22:00)  HR: 86 (10-31-18 @ 22:00) (74 - 105)  BP: 122/76 (10-31-18 @ 22:00) (104/76 - 160/99)  RR: 12 (10-31-18 @ 22:00) (12 - 20)  SpO2: 99% (10-31-18 @ 22:00) (95% - 100%)  Wt(kg): --  I&O's Summary    31 Oct 2018 07:01  -  31 Oct 2018 22:30  --------------------------------------------------------  IN: 720 mL / OUT: 250 mL / NET: 470 mL        LABS:                        13.1   5.65  )-----------( 326      ( 31 Oct 2018 06:18 )             40.4     10-    144  |  110<H>  |  9   ----------------------------<  90  3.3<L>   |  22  |  0.48<L>    Ca    7.9<L>      31 Oct 2018 09:40  Phos  3.6     10-30  Mg     2.6     10-30    TPro  8.1  /  Alb  4.0  /  TBili  0.6  /  DBili  x   /  AST  29  /  ALT  27  /  AlkPhos  78  10-30    PT/INR - ( 31 Oct 2018 06:18 )   PT: 11.3 SEC;   INR: 1.02          PTT - ( 31 Oct 2018 06:18 )  PTT:32.8 SEC  Urinalysis Basic - ( 30 Oct 2018 06:50 )    Color: YELLOW / Appearance: CLEAR / S.021 / pH: 6.0  Gluc: NEGATIVE / Ketone: NEGATIVE  / Bili: NEGATIVE / Urobili: NORMAL   Blood: NEGATIVE / Protein: 10 / Nitrite: NEGATIVE   Leuk Esterase: NEGATIVE / RBC: x / WBC x   Sq Epi: x / Non Sq Epi: x / Bacteria: x      CAPILLARY BLOOD GLUCOSE            Urinalysis Basic - ( 30 Oct 2018 06:50 )    Color: YELLOW / Appearance: CLEAR / S.021 / pH: 6.0  Gluc: NEGATIVE / Ketone: NEGATIVE  / Bili: NEGATIVE / Urobili: NORMAL   Blood: NEGATIVE / Protein: 10 / Nitrite: NEGATIVE   Leuk Esterase: NEGATIVE / RBC: x / WBC x   Sq Epi: x / Non Sq Epi: x / Bacteria: x        MEDICATIONS  (STANDING):  amoxicillin  875 milliGRAM(s)/clavulanate 1 Tablet(s) Oral two times a day  ascorbic acid 500 milliGRAM(s) Oral daily  benzocaine 15 mG/menthol 3.6 mG Lozenge 1 Lozenge Oral four times a day  calcium carbonate 1250 mG  + Vitamin D (OsCal 500 + D) 1 Tablet(s) Oral two times a day  enoxaparin Injectable 40 milliGRAM(s) SubCutaneous daily  influenza   Vaccine 0.5 milliLiter(s) IntraMuscular once  lactobacillus acidophilus 1 Tablet(s) Oral every 12 hours  sodium chloride 0.9%. 1000 milliLiter(s) (75 mL/Hr) IV Continuous <Continuous>    MEDICATIONS  (PRN):  acetaminophen   Tablet .. 650 milliGRAM(s) Oral every 6 hours PRN Mild Pain (1 - 3)  fentaNYL    Injectable 25 MICROGram(s) IV Push every 5 minutes PRN Severe Pain (7 - 10)  HYDROmorphone  Injectable 0.5 milliGRAM(s) IV Push every 10 minutes PRN Moderate Pain (4 - 6)  morphine  - Injectable 2 milliGRAM(s) IV Push every 6 hours PRN Severe Pain (7 - 10)  ondansetron Injectable 4 milliGRAM(s) IV Push every 4 hours PRN Nausea and/or Vomiting  oxyCODONE    5 mG/acetaminophen 325 mG 2 Tablet(s) Oral every 6 hours PRN Moderate Pain (4 - 6)          PHYSICAL EXAM:  GENERAL: NAD, well-groomed, well-developed  HEAD:  Atraumatic, Normocephalic  CHEST/LUNG: Clear to percussion bilaterally; No rales, rhonchi, wheezing, or rubs  HEART: Regular rate and rhythm; No murmurs, rubs, or gallops  ABDOMEN: Soft, Nontender, Nondistended; Bowel sounds present  EXTREMITIES:  2+ Peripheral Pulses, No clubbing, cyanosis, or edema  LYMPH: No lymphadenopathy noted  SKIN: No rashes or lesions    Care Discussed with Consultants/Other Providers [ ] YES  [ ] NO
Patient is a 68y old  Female who presents with a chief complaint of S/P Mechanical fall 2 days ago, + Rt 5th Metacarpal FX, Rt Calcaneal FX , + sore throat, (31 Oct 2018 22:29)       INTERVAL HPI/OVERNIGHT EVENTS:  Patient seen and evaluated at bedside.  Pt is resting comfortable in NAD. Denies N/V/F/C.      Allergies    No Known Allergies    Intolerances        Vital Signs Last 24 Hrs  T(C): 37.2 (01 Nov 2018 05:56), Max: 37.2 (31 Oct 2018 22:00)  T(F): 99 (01 Nov 2018 05:56), Max: 99 (01 Nov 2018 05:56)  HR: 94 (01 Nov 2018 00:00) (74 - 105)  BP: 149/93 (01 Nov 2018 05:56) (104/76 - 160/99)  BP(mean): 93 (01 Nov 2018 00:00) (83 - 97)  RR: 16 (01 Nov 2018 05:56) (12 - 20)  SpO2: 96% (01 Nov 2018 05:56) (95% - 100%)    LABS:                        13.5   11.48 )-----------( 374      ( 01 Nov 2018 05:42 )             41.4     11-01    139  |  104  |  9   ----------------------------<  88  3.8   |  22  |  0.58    Ca    8.1<L>      01 Nov 2018 05:45      PT/INR - ( 31 Oct 2018 06:18 )   PT: 11.3 SEC;   INR: 1.02          PTT - ( 31 Oct 2018 06:18 )  PTT:32.8 SEC    CAPILLARY BLOOD GLUCOSE          Lower Extremity Physical Exam:  Splint kept clean dry intact, no strikethrough noted, no acute pain at bedside.    RADIOLOGY & ADDITIONAL TESTS:
Podiatry Pager #: [phone] ([facility])/ [phone] ([facility])    Patient is a 68y old  Female who presents with a chief complaint of S/P Mechanical fall 2 days ago, + Rt 5th Metacarpal FX, Rt Calcaneal FX , + sore throat, (30 Oct 2018 19:14)      INTERVAL HPI/OVERNIGHT EVENTS:   Pt is scheduled for R foot calcaneal ORIF with Dr. Orozco at 4:00pm. Patient is aware of procedure and is NPO since midnight.    MEDICATIONS  (STANDING):  amoxicillin  875 milliGRAM(s)/clavulanate 1 Tablet(s) Oral two times a day  ascorbic acid 500 milliGRAM(s) Oral daily  benzocaine 15 mG/menthol 3.6 mG Lozenge 1 Lozenge Oral four times a day  calcium carbonate 1250 mG  + Vitamin D (OsCal 500 + D) 1 Tablet(s) Oral two times a day  enoxaparin Injectable 40 milliGRAM(s) SubCutaneous daily  lactobacillus acidophilus 1 Tablet(s) Oral every 12 hours  sodium chloride 0.9%. 1000 milliLiter(s) (75 mL/Hr) IV Continuous <Continuous>    MEDICATIONS  (PRN):  acetaminophen   Tablet .. 650 milliGRAM(s) Oral every 8 hours PRN Moderate Pain (4 - 6), Severe Pain (7 - 10)      Allergies    No Known Allergies    Intolerances        Vital Signs Last 24 Hrs  T(C): 36.7 (30 Oct 2018 21:48), Max: 37.2 (30 Oct 2018 10:45)  T(F): 98.1 (30 Oct 2018 21:48), Max: 99 (30 Oct 2018 10:45)  HR: 84 (30 Oct 2018 21:48) (66 - 88)  BP: 131/70 (30 Oct 2018 21:48) (122/85 - 134/82)  BP(mean): --  RR: 18 (30 Oct 2018 21:48) (16 - 18)  SpO2: 97% (30 Oct 2018 21:48) (96% - 97%)    LABS:                        14.7   6.16  )-----------( 337      ( 30 Oct 2018 06:50 )             45.5     10-30    143  |  101  |  12  ----------------------------<  90  4.6   |  26  |  0.65    Ca    9.6      30 Oct 2018 06:50  Phos  3.6     10-30  Mg     2.6     10-30    TPro  8.1  /  Alb  4.0  /  TBili  0.6  /  DBili  x   /  AST  29  /  ALT  27  /  AlkPhos  78  10-30    PT/INR - ( 30 Oct 2018 06:50 )   PT: 10.5 SEC;   INR: 0.91          PTT - ( 30 Oct 2018 06:50 )  PTT:39.6 SEC  Urinalysis Basic - ( 30 Oct 2018 06:50 )    Color: YELLOW / Appearance: CLEAR / S.021 / pH: 6.0  Gluc: NEGATIVE / Ketone: NEGATIVE  / Bili: NEGATIVE / Urobili: NORMAL   Blood: NEGATIVE / Protein: 10 / Nitrite: NEGATIVE   Leuk Esterase: NEGATIVE / RBC: x / WBC x   Sq Epi: x / Non Sq Epi: x / Bacteria: x      CAPILLARY BLOOD GLUCOSE          RADIOLOGY & ADDITIONAL TESTS:    Plan:   To OR today at 4:00pm with Dr. Orozco for R foot calcaneal ORIF.   CXR on sunrise.  EKG on sunrise.  Medical/Cardiac clearance since 10/30 and documented in chart.  Consent signed and in chart.  Procedure was explained to patient in detail. All alternatives, risks and complications were discussed. All questions answered.
Patient is a 68y old  Female who presents with a chief complaint of S/P Mechanical fall 2 days ago, + Rt 5th Metacarpal FX, Rt Calcaneal FX , + sore throat, (31 Oct 2018 12:28)      Any change in ROS: Doing ok : No respiratory Symptoms    MEDICATIONS  (STANDING):  amoxicillin  875 milliGRAM(s)/clavulanate 1 Tablet(s) Oral two times a day  ascorbic acid 500 milliGRAM(s) Oral daily  benzocaine 15 mG/menthol 3.6 mG Lozenge 1 Lozenge Oral four times a day  calcium carbonate 1250 mG  + Vitamin D (OsCal 500 + D) 1 Tablet(s) Oral two times a day  enoxaparin Injectable 40 milliGRAM(s) SubCutaneous daily  influenza   Vaccine 0.5 milliLiter(s) IntraMuscular once  lactobacillus acidophilus 1 Tablet(s) Oral every 12 hours  sodium chloride 0.9%. 1000 milliLiter(s) (75 mL/Hr) IV Continuous <Continuous>    MEDICATIONS  (PRN):  acetaminophen   Tablet .. 650 milliGRAM(s) Oral every 8 hours PRN Moderate Pain (4 - 6), Severe Pain (7 - 10)    Vital Signs Last 24 Hrs  T(C): 37.1 (31 Oct 2018 13:18), Max: 37.1 (31 Oct 2018 11:02)  T(F): 98.8 (31 Oct 2018 13:18), Max: 98.8 (31 Oct 2018 11:02)  HR: 74 (31 Oct 2018 11:02) (74 - 88)  BP: 157/89 (31 Oct 2018 13:18) (127/87 - 157/89)  BP(mean): --  RR: 18 (31 Oct 2018 11:02) (16 - 18)  SpO2: 98% (31 Oct 2018 13:18) (96% - 98%)    I&O's Summary        Physical Exam:   GENERAL: NAD, well-groomed, well-developed  HEENT: SINDY/   Atraumatic, Normocephalic  ENMT: No tonsillar erythema, exudates, or enlargement; Moist mucous membranes, Good dentition, No lesions  NECK: Supple, No JVD, Normal thyroid  CHEST/LUNG: Clear to auscultaion, ; No rales, rhonchi, wheezing, or rubs  CVS: Regular rate and rhythm; No murmurs, rubs, or gallops  GI: : Soft, Nontender, Nondistended; Bowel sounds present  NERVOUS SYSTEM:  Alert & Oriented X3  EXTREMITIES:  2+ Peripheral Pulses, No clubbing, cyanosis, or edema  LYMPH: No lymphadenopathy noted  SKIN: No rashes or lesions  ENDOCRINOLOGY: No Thyromegaly  PSYCH: Appropriate    Labs:                              13.1   5.65  )-----------( 326      ( 31 Oct 2018 06:18 )             40.4                         14.7   6.16  )-----------( 337      ( 30 Oct 2018 06:50 )             45.5                         14.1   6.47  )-----------( 284      ( 29 Oct 2018 17:20 )             43.0     10-31    144  |  110<H>  |  9   ----------------------------<  90  3.3<L>   |  22  |  0.48<L>  10-31    143  |  110<H>  |  10  ----------------------------<  83  3.3<L>   |  21<L>  |  0.46<L>  10-30    143  |  101  |  12  ----------------------------<  90  4.6   |  26  |  0.65  10-29    140  |  102  |  13  ----------------------------<  109<H>  4.5   |  24  |  0.55    Ca    7.9<L>      31 Oct 2018 09:40  Ca    7.2<L>      31 Oct 2018 06:18  Ca    9.6      30 Oct 2018 06:50  Ca    9.2      29 Oct 2018 17:20  Phos  3.6     1030  Mg     2.6     10    TPro  8.1  /  Alb  4.0  /  TBili  0.6  /  DBili  x   /  AST  29  /  ALT  27  /  AlkPhos  78  1030  TPro  7.4  /  Alb  3.7  /  TBili  0.4  /  DBili  x   /  AST  31  /  ALT  22  /  AlkPhos  69  1029    CAPILLARY BLOOD GLUCOSE          LIVER FUNCTIONS - ( 30 Oct 2018 06:50 )  Alb: 4.0 g/dL / Pro: 8.1 g/dL / ALK PHOS: 78 u/L / ALT: 27 u/L / AST: 29 u/L / GGT: x           PT/INR - ( 31 Oct 2018 06:18 )   PT: 11.3 SEC;   INR: 1.02          PTT - ( 31 Oct 2018 06:18 )  PTT:32.8 SEC  Urinalysis Basic - ( 30 Oct 2018 06:50 )    Color: YELLOW / Appearance: CLEAR / S.021 / pH: 6.0  Gluc: NEGATIVE / Ketone: NEGATIVE  / Bili: NEGATIVE / Urobili: NORMAL   Blood: NEGATIVE / Protein: 10 / Nitrite: NEGATIVE   Leuk Esterase: NEGATIVE / RBC: x / WBC x   Sq Epi: x / Non Sq Epi: x / Bacteria: x      < from: CT Chest No Cont (10.30.18 @ 11:49) >  mparison: Chest x-ray dated 2018    Tubes/Lines:     None.    Mediastinum/Vessels/Heart:     Aorta and pulmonary arteries are normal in   size. There is no pericardial effusion. No lymphadenopathy. Thyroid gland   is unremarkable. The opacity seen on recent chest x-ray was due to   overlapping normal structures    Lungs/Pleura/Airways: No consolidations, edema, effusion or pneumothorax.   A calcified granuloma is noted in the right lower lobe.    Visualized abdomen:   Unremarkable noncontrast appearance of the upper   abdomen    Bones and soft tissues:     No suspicious osseous lesions. Degenerative   changes noted throughout the spine.    IMPRESSION:    When compared with recent chest x-ray of 2018:    There is no mediastinal mass identified. The apparent opacity seen on   recent chest x-ray was due to superimposition of normal structures. No   further follow-up is required for this finding.                  JANIE IVERSON M.D., ATTENDING RADIOLOGIST  This document has been electronically signed. Oct 30 2018  1:37PM        < end of copied text >        RECENT CULTURES:        RESPIRATORY CULTURES:          Studies  Chest X-RAY  CT SCAN Chest   Venous Dopplers: LE:   CT Abdomen  Others

## 2018-11-03 NOTE — PROGRESS NOTE ADULT - ASSESSMENT
67 y/o female HX of Osteoporosis on [employer], Recent travel to [place] returned one week ago,  patient  fell 2 days ago at home due to weakness and recent travel, mechanical fall, no LOC, no head trauma,  fracturing Rt  5th metacarpal and injuring and Rt  foot, and she was cared for at [facility].       recalled for CT scan, pt also c/o  several days for  tonsillar pain, + Fever at home, sore   throat, + dry Cough, No wt Loss, no Hemoptysis, no Diaphoresis, No rash., no diarrhea, no HA, no Dizziness, no dysuria, no abdominal pain, No CP, NO SOB, pt was started on PO Amoxicillin 500 mg BID on the last ER visit, Chest X ray showed :< from: Xray Chest 2 Views PA/Lat (10.27.18 @ 14:06) >Right upper paratracheal opacity is of unclear etiology. CT Chest No Contrast was ordered tonight as well as RVP,   Pt had a right posterior splint on her leg, and right splint on her wrist placed by the ED on Friday. Pt was seen By Podiatry tonight , needs pain control, PT consult, NWB ,    Pt being evaluated by Hand surgery and Podiatry.    Labs: Na 140, K+ 4.5, BUN 13, Creatinine 0.55, Glucose 109, LFT Normal, WBC 6.47, Hgb 14.1, Platelet 284 ,     Vitals: Tem 98.1, HR 84, /74, RR 18, 95% RA, (29 Oct 2018 22:33)    Recommend:    - Pt with (+) sore throat.  Thus far RVP (-).  EBV serologies indicative of prior exposure.  Infectious mononucleosis screen neg.  HIV neg.  CT chest without mediastinal mass or acute pathology.  Complete course of Augmentin x 10 days (10/29 through 11/8) for tonsillitis.  Pt clinically improving.      - CMV serologies (show prior exposure).      *Pt POD #3 of R calcaneal ORIF, appreciate podiatry f/u.  Pt also seen by Orthopedics for with 5th metacarpal shaft fracture, no reduction needed, s/p splint.    No further ID w/u.  d/c planning as per primary team.      Will follow,    Spring Mcnamara  [phone]

## 2018-11-05 LAB
CMV DNA CSF QL NAA+PROBE: NOT DETECTED IU/ML — SIGNIFICANT CHANGE UP
CMV DNA SPEC NAA+PROBE-LOG#: SIGNIFICANT CHANGE UP LOGIU/ML

## 2018-11-27 NOTE — ED PROVIDER NOTE - CPE EDP CARDIAC NORM
normal... [Hoarseness] : hoarseness [Nasal Discharge] : nasal discharge [Sore Throat] : sore throat [Postnasal Drip] : postnasal drip [Cough] : cough [Dyspnea on Exertion] : dyspnea on exertion [Negative] : Heme/Lymph [Earache] : no earache [Hearing Loss] : no hearing loss [Nosebleed] : no nosebleeds [Shortness Of Breath] : no shortness of breath [Wheezing] : no wheezing

## 2019-03-06 ENCOUNTER — TRANSCRIPTION ENCOUNTER (OUTPATIENT)
Age: 69
End: 2019-03-06

## 2019-06-11 NOTE — PROGRESS NOTE ADULT - PROBLEM SELECTOR PLAN 3
ED Nurse Note:



pt is on bed, vs within normal limit. no complains as of the moment. will 
continue to monitor. for or today

## 2020-05-12 NOTE — PHYSICAL THERAPY INITIAL EVALUATION ADULT - DIAGNOSIS, PT EVAL
PROCEDURES:  Laparoscopic left salpingectomy 12-May-2020 19:00:17  Brittney June Deconditioning, decreased strength, decreased balance, decreased endurance, decreased postural control all limiting pts. ability to perform functional mobility

## 2020-06-05 ENCOUNTER — TRANSCRIPTION ENCOUNTER (OUTPATIENT)
Age: 70
End: 2020-06-05

## 2020-10-25 NOTE — PROGRESS NOTE ADULT - REASON FOR ADMISSION
S/P Mechanical fall 2 days ago, + Rt 5th Metacarpal FX, Rt Calcaneal FX , + sore throat,
repositioned/side rails up/treatment delay explained/plan of care explained
S/P Mechanical fall 2 days ago, + Rt 5th Metacarpal FX, Rt Calcaneal FX , + sore throat,

## 2021-11-05 ENCOUNTER — TRANSCRIPTION ENCOUNTER (OUTPATIENT)
Age: 71
End: 2021-11-05

## 2022-04-12 NOTE — H&P ADULT - PSYCHIATRIC DETAILS

## 2024-07-30 NOTE — PHYSICAL THERAPY INITIAL EVALUATION ADULT - DISCHARGE PLANNER MADE AWARE
Incoming call from [facility] pharmacy requesting new script be sent in for prednisone 20mg BID qty 20 for 10 days.    yes